# Patient Record
Sex: FEMALE | Race: BLACK OR AFRICAN AMERICAN | NOT HISPANIC OR LATINO | Employment: OTHER | URBAN - METROPOLITAN AREA
[De-identification: names, ages, dates, MRNs, and addresses within clinical notes are randomized per-mention and may not be internally consistent; named-entity substitution may affect disease eponyms.]

---

## 2021-09-24 ENCOUNTER — LAB VISIT (OUTPATIENT)
Dept: PRIMARY CARE CLINIC | Facility: OTHER | Age: 21
End: 2021-09-24
Attending: INTERNAL MEDICINE
Payer: MEDICAID

## 2021-09-24 DIAGNOSIS — Z20.822 ENCOUNTER FOR LABORATORY TESTING FOR COVID-19 VIRUS: ICD-10-CM

## 2021-09-24 LAB
SARS-COV-2 RNA RESP QL NAA+PROBE: NOT DETECTED
SARS-COV-2- CYCLE NUMBER: NORMAL

## 2021-09-24 PROCEDURE — U0003 INFECTIOUS AGENT DETECTION BY NUCLEIC ACID (DNA OR RNA); SEVERE ACUTE RESPIRATORY SYNDROME CORONAVIRUS 2 (SARS-COV-2) (CORONAVIRUS DISEASE [COVID-19]), AMPLIFIED PROBE TECHNIQUE, MAKING USE OF HIGH THROUGHPUT TECHNOLOGIES AS DESCRIBED BY CMS-2020-01-R: HCPCS | Performed by: INTERNAL MEDICINE

## 2022-01-07 ENCOUNTER — HOSPITAL ENCOUNTER (OUTPATIENT)
Facility: HOSPITAL | Age: 22
Discharge: HOME | End: 2022-01-11
Attending: EMERGENCY MEDICINE | Admitting: EMERGENCY MEDICINE
Payer: MEDICAID

## 2022-01-07 DIAGNOSIS — G40.909 NONINTRACTABLE EPILEPSY WITHOUT STATUS EPILEPTICUS, UNSPECIFIED EPILEPSY TYPE: ICD-10-CM

## 2022-01-07 DIAGNOSIS — U07.1 COVID-19: ICD-10-CM

## 2022-01-07 DIAGNOSIS — R56.9 SEIZURE: Primary | ICD-10-CM

## 2022-01-07 DIAGNOSIS — R00.0 TACHYCARDIA: ICD-10-CM

## 2022-01-07 LAB
ALBUMIN SERPL BCP-MCNC: 3.8 G/DL (ref 3.5–5.2)
ALP SERPL-CCNC: 71 U/L (ref 55–135)
ALT SERPL W/O P-5'-P-CCNC: 10 U/L (ref 10–44)
ANION GAP SERPL CALC-SCNC: 13 MMOL/L (ref 8–16)
AST SERPL-CCNC: 18 U/L (ref 10–40)
BASOPHILS # BLD AUTO: 0.02 K/UL (ref 0–0.2)
BASOPHILS NFR BLD: 0.2 % (ref 0–1.9)
BILIRUB SERPL-MCNC: 0.7 MG/DL (ref 0.1–1)
BUN SERPL-MCNC: 7 MG/DL (ref 6–20)
CALCIUM SERPL-MCNC: 9.2 MG/DL (ref 8.7–10.5)
CHLORIDE SERPL-SCNC: 102 MMOL/L (ref 95–110)
CO2 SERPL-SCNC: 20 MMOL/L (ref 23–29)
CREAT SERPL-MCNC: 0.8 MG/DL (ref 0.5–1.4)
CTP QC/QA: YES
DIFFERENTIAL METHOD: ABNORMAL
EOSINOPHIL # BLD AUTO: 0 K/UL (ref 0–0.5)
EOSINOPHIL NFR BLD: 0 % (ref 0–8)
ERYTHROCYTE [DISTWIDTH] IN BLOOD BY AUTOMATED COUNT: 13.7 % (ref 11.5–14.5)
EST. GFR  (AFRICAN AMERICAN): >60 ML/MIN/1.73 M^2
EST. GFR  (NON AFRICAN AMERICAN): >60 ML/MIN/1.73 M^2
GLUCOSE SERPL-MCNC: 80 MG/DL (ref 70–110)
HCT VFR BLD AUTO: 40 % (ref 37–48.5)
HGB BLD-MCNC: 13.2 G/DL (ref 12–16)
IMM GRANULOCYTES # BLD AUTO: 0.04 K/UL (ref 0–0.04)
IMM GRANULOCYTES NFR BLD AUTO: 0.3 % (ref 0–0.5)
LYMPHOCYTES # BLD AUTO: 1.2 K/UL (ref 1–4.8)
LYMPHOCYTES NFR BLD: 9.7 % (ref 18–48)
MAGNESIUM SERPL-MCNC: 1.9 MG/DL (ref 1.6–2.6)
MCH RBC QN AUTO: 30.3 PG (ref 27–31)
MCHC RBC AUTO-ENTMCNC: 33 G/DL (ref 32–36)
MCV RBC AUTO: 92 FL (ref 82–98)
MONOCYTES # BLD AUTO: 1.3 K/UL (ref 0.3–1)
MONOCYTES NFR BLD: 10.5 % (ref 4–15)
NEUTROPHILS # BLD AUTO: 10.1 K/UL (ref 1.8–7.7)
NEUTROPHILS NFR BLD: 79.3 % (ref 38–73)
NRBC BLD-RTO: 0 /100 WBC
PLATELET # BLD AUTO: 303 K/UL (ref 150–450)
PMV BLD AUTO: 8.8 FL (ref 9.2–12.9)
POTASSIUM SERPL-SCNC: 3 MMOL/L (ref 3.5–5.1)
PROT SERPL-MCNC: 7.8 G/DL (ref 6–8.4)
RBC # BLD AUTO: 4.36 M/UL (ref 4–5.4)
SARS-COV-2 RDRP RESP QL NAA+PROBE: POSITIVE
SODIUM SERPL-SCNC: 135 MMOL/L (ref 136–145)
WBC # BLD AUTO: 12.74 K/UL (ref 3.9–12.7)

## 2022-01-07 PROCEDURE — G0378 HOSPITAL OBSERVATION PER HR: HCPCS

## 2022-01-07 PROCEDURE — 93010 EKG 12-LEAD: ICD-10-PCS | Mod: ,,, | Performed by: INTERNAL MEDICINE

## 2022-01-07 PROCEDURE — 96375 TX/PRO/DX INJ NEW DRUG ADDON: CPT

## 2022-01-07 PROCEDURE — U0002 COVID-19 LAB TEST NON-CDC: HCPCS | Performed by: STUDENT IN AN ORGANIZED HEALTH CARE EDUCATION/TRAINING PROGRAM

## 2022-01-07 PROCEDURE — 80053 COMPREHEN METABOLIC PANEL: CPT | Performed by: STUDENT IN AN ORGANIZED HEALTH CARE EDUCATION/TRAINING PROGRAM

## 2022-01-07 PROCEDURE — 99285 PR EMERGENCY DEPT VISIT,LEVEL V: ICD-10-PCS | Mod: CR,CS,, | Performed by: EMERGENCY MEDICINE

## 2022-01-07 PROCEDURE — 85025 COMPLETE CBC W/AUTO DIFF WBC: CPT | Performed by: STUDENT IN AN ORGANIZED HEALTH CARE EDUCATION/TRAINING PROGRAM

## 2022-01-07 PROCEDURE — 93005 ELECTROCARDIOGRAM TRACING: CPT

## 2022-01-07 PROCEDURE — 99285 EMERGENCY DEPT VISIT HI MDM: CPT | Mod: CR,CS,, | Performed by: EMERGENCY MEDICINE

## 2022-01-07 PROCEDURE — 80185 ASSAY OF PHENYTOIN TOTAL: CPT | Performed by: STUDENT IN AN ORGANIZED HEALTH CARE EDUCATION/TRAINING PROGRAM

## 2022-01-07 PROCEDURE — 99285 EMERGENCY DEPT VISIT HI MDM: CPT | Mod: 25

## 2022-01-07 PROCEDURE — 83735 ASSAY OF MAGNESIUM: CPT | Performed by: STUDENT IN AN ORGANIZED HEALTH CARE EDUCATION/TRAINING PROGRAM

## 2022-01-07 PROCEDURE — 93010 ELECTROCARDIOGRAM REPORT: CPT | Mod: ,,, | Performed by: INTERNAL MEDICINE

## 2022-01-07 RX ORDER — LEVETIRACETAM 500 MG/5ML
2000 INJECTION, SOLUTION, CONCENTRATE INTRAVENOUS
Status: COMPLETED | OUTPATIENT
Start: 2022-01-07 | End: 2022-01-08

## 2022-01-08 PROBLEM — G40.909 EPILEPSY: Status: ACTIVE | Noted: 2022-01-08

## 2022-01-08 PROBLEM — U07.1 COVID-19: Status: ACTIVE | Noted: 2022-01-08

## 2022-01-08 LAB
AMPHET+METHAMPHET UR QL: NEGATIVE
APTT BLDCRRT: 28.8 SEC (ref 21–32)
BACTERIA #/AREA URNS AUTO: ABNORMAL /HPF
BARBITURATES UR QL SCN>200 NG/ML: NEGATIVE
BASOPHILS # BLD AUTO: 0.01 K/UL (ref 0–0.2)
BASOPHILS NFR BLD: 0.1 % (ref 0–1.9)
BENZODIAZ UR QL SCN>200 NG/ML: NEGATIVE
BILIRUB UR QL STRIP: NEGATIVE
BZE UR QL SCN: NEGATIVE
CANNABINOIDS UR QL SCN: NEGATIVE
CK SERPL-CCNC: 87 U/L (ref 20–180)
CLARITY UR REFRACT.AUTO: ABNORMAL
COLOR UR AUTO: YELLOW
CREAT UR-MCNC: 163 MG/DL (ref 15–325)
D DIMER PPP IA.FEU-MCNC: 0.4 MG/L FEU
DIFFERENTIAL METHOD: ABNORMAL
EOSINOPHIL # BLD AUTO: 0 K/UL (ref 0–0.5)
EOSINOPHIL NFR BLD: 0 % (ref 0–8)
ERYTHROCYTE [DISTWIDTH] IN BLOOD BY AUTOMATED COUNT: 13.7 % (ref 11.5–14.5)
ERYTHROCYTE [SEDIMENTATION RATE] IN BLOOD BY WESTERGREN METHOD: 45 MM/HR (ref 0–36)
ETHANOL UR-MCNC: <10 MG/DL
FERRITIN SERPL-MCNC: 51 NG/ML (ref 20–300)
GLUCOSE UR QL STRIP: NEGATIVE
HCT VFR BLD AUTO: 34.4 % (ref 37–48.5)
HGB BLD-MCNC: 11.6 G/DL (ref 12–16)
HGB UR QL STRIP: ABNORMAL
HYALINE CASTS UR QL AUTO: 0 /LPF
IMM GRANULOCYTES # BLD AUTO: 0.05 K/UL (ref 0–0.04)
IMM GRANULOCYTES NFR BLD AUTO: 0.4 % (ref 0–0.5)
INR PPP: 1 (ref 0.8–1.2)
KETONES UR QL STRIP: ABNORMAL
LDH SERPL L TO P-CCNC: 185 U/L (ref 110–260)
LEUKOCYTE ESTERASE UR QL STRIP: ABNORMAL
LYMPHOCYTES # BLD AUTO: 1.6 K/UL (ref 1–4.8)
LYMPHOCYTES NFR BLD: 13.8 % (ref 18–48)
MCH RBC QN AUTO: 29.7 PG (ref 27–31)
MCHC RBC AUTO-ENTMCNC: 33.7 G/DL (ref 32–36)
MCV RBC AUTO: 88 FL (ref 82–98)
METHADONE UR QL SCN>300 NG/ML: NEGATIVE
MICROSCOPIC COMMENT: ABNORMAL
MONOCYTES # BLD AUTO: 1 K/UL (ref 0.3–1)
MONOCYTES NFR BLD: 9.2 % (ref 4–15)
NEUTROPHILS # BLD AUTO: 8.6 K/UL (ref 1.8–7.7)
NEUTROPHILS NFR BLD: 76.5 % (ref 38–73)
NITRITE UR QL STRIP: POSITIVE
NRBC BLD-RTO: 0 /100 WBC
OPIATES UR QL SCN: NEGATIVE
PCP UR QL SCN>25 NG/ML: NEGATIVE
PH UR STRIP: 5 [PH] (ref 5–8)
PHENYTOIN SERPL-MCNC: <1.8 UG/ML (ref 10–20)
PLATELET # BLD AUTO: 294 K/UL (ref 150–450)
PMV BLD AUTO: 8.6 FL (ref 9.2–12.9)
POCT GLUCOSE: 97 MG/DL (ref 70–110)
PROT UR QL STRIP: ABNORMAL
PROTHROMBIN TIME: 11.3 SEC (ref 9–12.5)
RBC # BLD AUTO: 3.9 M/UL (ref 4–5.4)
RBC #/AREA URNS AUTO: 10 /HPF (ref 0–4)
SP GR UR STRIP: 1.02 (ref 1–1.03)
SQUAMOUS #/AREA URNS AUTO: 5 /HPF
TOXICOLOGY INFORMATION: NORMAL
URN SPEC COLLECT METH UR: ABNORMAL
WBC # BLD AUTO: 11.25 K/UL (ref 3.9–12.7)
WBC #/AREA URNS AUTO: >100 /HPF (ref 0–5)

## 2022-01-08 PROCEDURE — 87186 SC STD MICRODIL/AGAR DIL: CPT | Performed by: STUDENT IN AN ORGANIZED HEALTH CARE EDUCATION/TRAINING PROGRAM

## 2022-01-08 PROCEDURE — 25000242 PHARM REV CODE 250 ALT 637 W/ HCPCS: Performed by: STUDENT IN AN ORGANIZED HEALTH CARE EDUCATION/TRAINING PROGRAM

## 2022-01-08 PROCEDURE — 85652 RBC SED RATE AUTOMATED: CPT | Performed by: STUDENT IN AN ORGANIZED HEALTH CARE EDUCATION/TRAINING PROGRAM

## 2022-01-08 PROCEDURE — 85730 THROMBOPLASTIN TIME PARTIAL: CPT | Performed by: STUDENT IN AN ORGANIZED HEALTH CARE EDUCATION/TRAINING PROGRAM

## 2022-01-08 PROCEDURE — 87077 CULTURE AEROBIC IDENTIFY: CPT | Performed by: STUDENT IN AN ORGANIZED HEALTH CARE EDUCATION/TRAINING PROGRAM

## 2022-01-08 PROCEDURE — 25000003 PHARM REV CODE 250: Performed by: STUDENT IN AN ORGANIZED HEALTH CARE EDUCATION/TRAINING PROGRAM

## 2022-01-08 PROCEDURE — 80307 DRUG TEST PRSMV CHEM ANLYZR: CPT | Performed by: STUDENT IN AN ORGANIZED HEALTH CARE EDUCATION/TRAINING PROGRAM

## 2022-01-08 PROCEDURE — 85610 PROTHROMBIN TIME: CPT | Performed by: STUDENT IN AN ORGANIZED HEALTH CARE EDUCATION/TRAINING PROGRAM

## 2022-01-08 PROCEDURE — 99225 PR SUBSEQUENT OBSERVATION CARE,LEVEL II: ICD-10-PCS | Mod: ,,, | Performed by: INTERNAL MEDICINE

## 2022-01-08 PROCEDURE — 25000003 PHARM REV CODE 250: Performed by: INTERNAL MEDICINE

## 2022-01-08 PROCEDURE — 96361 HYDRATE IV INFUSION ADD-ON: CPT | Performed by: EMERGENCY MEDICINE

## 2022-01-08 PROCEDURE — 63600175 PHARM REV CODE 636 W HCPCS: Performed by: STUDENT IN AN ORGANIZED HEALTH CARE EDUCATION/TRAINING PROGRAM

## 2022-01-08 PROCEDURE — 85379 FIBRIN DEGRADATION QUANT: CPT | Performed by: STUDENT IN AN ORGANIZED HEALTH CARE EDUCATION/TRAINING PROGRAM

## 2022-01-08 PROCEDURE — 94761 N-INVAS EAR/PLS OXIMETRY MLT: CPT

## 2022-01-08 PROCEDURE — 99900035 HC TECH TIME PER 15 MIN (STAT)

## 2022-01-08 PROCEDURE — 87088 URINE BACTERIA CULTURE: CPT | Performed by: STUDENT IN AN ORGANIZED HEALTH CARE EDUCATION/TRAINING PROGRAM

## 2022-01-08 PROCEDURE — 81001 URINALYSIS AUTO W/SCOPE: CPT | Performed by: STUDENT IN AN ORGANIZED HEALTH CARE EDUCATION/TRAINING PROGRAM

## 2022-01-08 PROCEDURE — 82550 ASSAY OF CK (CPK): CPT | Performed by: STUDENT IN AN ORGANIZED HEALTH CARE EDUCATION/TRAINING PROGRAM

## 2022-01-08 PROCEDURE — 99225 PR SUBSEQUENT OBSERVATION CARE,LEVEL II: CPT | Mod: ,,, | Performed by: INTERNAL MEDICINE

## 2022-01-08 PROCEDURE — 82728 ASSAY OF FERRITIN: CPT | Performed by: STUDENT IN AN ORGANIZED HEALTH CARE EDUCATION/TRAINING PROGRAM

## 2022-01-08 PROCEDURE — G0378 HOSPITAL OBSERVATION PER HR: HCPCS

## 2022-01-08 PROCEDURE — 85025 COMPLETE CBC W/AUTO DIFF WBC: CPT | Performed by: STUDENT IN AN ORGANIZED HEALTH CARE EDUCATION/TRAINING PROGRAM

## 2022-01-08 PROCEDURE — 83615 LACTATE (LD) (LDH) ENZYME: CPT | Performed by: STUDENT IN AN ORGANIZED HEALTH CARE EDUCATION/TRAINING PROGRAM

## 2022-01-08 PROCEDURE — 94640 AIRWAY INHALATION TREATMENT: CPT

## 2022-01-08 PROCEDURE — 87086 URINE CULTURE/COLONY COUNT: CPT | Performed by: STUDENT IN AN ORGANIZED HEALTH CARE EDUCATION/TRAINING PROGRAM

## 2022-01-08 RX ORDER — ONDANSETRON 8 MG/1
8 TABLET, ORALLY DISINTEGRATING ORAL EVERY 8 HOURS PRN
Status: DISCONTINUED | OUTPATIENT
Start: 2022-01-08 | End: 2022-01-11 | Stop reason: HOSPADM

## 2022-01-08 RX ORDER — LEVETIRACETAM 500 MG/5ML
500 INJECTION, SOLUTION, CONCENTRATE INTRAVENOUS EVERY 12 HOURS
Status: DISCONTINUED | OUTPATIENT
Start: 2022-01-09 | End: 2022-01-08

## 2022-01-08 RX ORDER — ALBUTEROL SULFATE 90 UG/1
2 AEROSOL, METERED RESPIRATORY (INHALATION) EVERY 6 HOURS
Status: DISCONTINUED | OUTPATIENT
Start: 2022-01-08 | End: 2022-01-09

## 2022-01-08 RX ORDER — LEVETIRACETAM 500 MG/5ML
750 INJECTION, SOLUTION, CONCENTRATE INTRAVENOUS EVERY 12 HOURS
Status: DISCONTINUED | OUTPATIENT
Start: 2022-01-09 | End: 2022-01-10

## 2022-01-08 RX ORDER — ASCORBIC ACID 250 MG
500 TABLET ORAL 2 TIMES DAILY
Status: DISCONTINUED | OUTPATIENT
Start: 2022-01-08 | End: 2022-01-11 | Stop reason: HOSPADM

## 2022-01-08 RX ORDER — SODIUM CHLORIDE 9 MG/ML
INJECTION, SOLUTION INTRAVENOUS CONTINUOUS
Status: DISCONTINUED | OUTPATIENT
Start: 2022-01-08 | End: 2022-01-10

## 2022-01-08 RX ORDER — IBUPROFEN 200 MG
24 TABLET ORAL
Status: DISCONTINUED | OUTPATIENT
Start: 2022-01-08 | End: 2022-01-11 | Stop reason: HOSPADM

## 2022-01-08 RX ORDER — GLUCAGON 1 MG
1 KIT INJECTION
Status: DISCONTINUED | OUTPATIENT
Start: 2022-01-08 | End: 2022-01-11 | Stop reason: HOSPADM

## 2022-01-08 RX ORDER — ACETAMINOPHEN 325 MG/1
650 TABLET ORAL EVERY 4 HOURS PRN
Status: DISCONTINUED | OUTPATIENT
Start: 2022-01-08 | End: 2022-01-11 | Stop reason: HOSPADM

## 2022-01-08 RX ORDER — ACETAMINOPHEN 325 MG/1
650 TABLET ORAL EVERY 4 HOURS PRN
Status: DISCONTINUED | OUTPATIENT
Start: 2022-01-08 | End: 2022-01-08

## 2022-01-08 RX ORDER — SODIUM CHLORIDE 0.9 % (FLUSH) 0.9 %
10 SYRINGE (ML) INJECTION
Status: DISCONTINUED | OUTPATIENT
Start: 2022-01-08 | End: 2022-01-11 | Stop reason: HOSPADM

## 2022-01-08 RX ORDER — IBUPROFEN 200 MG
16 TABLET ORAL
Status: DISCONTINUED | OUTPATIENT
Start: 2022-01-08 | End: 2022-01-11 | Stop reason: HOSPADM

## 2022-01-08 RX ADMIN — POTASSIUM BICARBONATE 20 MEQ: 391 TABLET, EFFERVESCENT ORAL at 12:01

## 2022-01-08 RX ADMIN — SODIUM CHLORIDE 50 ML/HR: 0.9 INJECTION, SOLUTION INTRAVENOUS at 08:01

## 2022-01-08 RX ADMIN — ALBUTEROL SULFATE 2 PUFF: 108 INHALANT RESPIRATORY (INHALATION) at 01:01

## 2022-01-08 RX ADMIN — LEVETIRACETAM 2000 MG: 500 INJECTION, SOLUTION INTRAVENOUS at 12:01

## 2022-01-08 RX ADMIN — ACETAMINOPHEN 650 MG: 325 TABLET ORAL at 08:01

## 2022-01-08 RX ADMIN — Medication 500 MG: at 09:01

## 2022-01-08 RX ADMIN — ALBUTEROL SULFATE 2 PUFF: 108 INHALANT RESPIRATORY (INHALATION) at 07:01

## 2022-01-08 RX ADMIN — MULTIPLE VITAMINS W/ MINERALS TAB 1 TABLET: TAB at 08:01

## 2022-01-08 RX ADMIN — ACETAMINOPHEN 650 MG: 325 TABLET ORAL at 01:01

## 2022-01-08 RX ADMIN — Medication 500 MG: at 08:01

## 2022-01-08 NOTE — PLAN OF CARE
[facility]  Neurology  Plan of Care  Inpatient consult to Neurology  Consult performed by: Pedro Irene MD  Consult ordered by: Stan Zepeda MD  Reason for consult: Breakthrough seizure management        Neurology consulted for breakthrough seizure management in Mary Gilmore, a 21 y.o. female with history of epilepsy admitted with witnessed breakthrough seizures in setting of COVID infection. Followed by Neurology at [facility] but does not remember name of Primary Neurologist; care everywhere without documentation. Patient states she is adherent to current anti seizure medication regimen (phenytoin 100 mg three times a day and IV phenytoin infusions with unknown frequency or dose). She notes feeling generally unwell the past few days as well as 2 breakthrough seizures prior to Emergency Department presentation. Per primary team history and physical documentation the patients mother states Ms Gilmore was post ictal following her seizure but did not display tongue biting or incontinence. Following her seizure the patient had body aches, chills, and one episode of vomiting.    ED evaluation notable for hypertension, tachycardia, and one brief episode of shaking with extremity movements lasting a couple seconds, however, no post ictal period. Workup notable for positive COVID testing. Patient managed with levetiracetam 2g loading dose and 500 mg two times a day.    No acute or concerning events noted by overnight staff. Febrile and hypotensive during AM rounds. Patient is conscious and comfortable.    Patient is alert, oriented, not in acute distress, and without focal neurologic deficits on neurological exam.    Diagnostics reviewed.    No pending diagnostics to follow up on.    Assessment  Known epileptic patient with breakthrough seizure likely secondary to underlying COVID infection. Back to neurologic baseline. No further seizures overnight. Notified of seizure per primary team at 11:45 AM.  Maintained on phenytoin 100 mg three times a day outpatient (unverified). Started on levetiractam inpatient. Primary team provider notes patient is reluctant to increase levetiracetam due to previous history of increased seizures on higher doses. Due to breakthrough seizure, recommend spot EEG (none listed in patients chart) and increase of levetiracetam to 750 mg BID until dilantin dosing and frequency can be verified. At that point can switch to home medication regimen. Will follow up EEG.    Recommendations  Diagnostic:  - spot EEG  Therapeutic  - increase levetiracetam to 750 mg BID    - start dilantin 100 mg TID (once verified)      Discussed patient with staff.   Neurology will follow-up with patient. Please contact Neurology for any questions or concerns.       Pedro Irene MD  Neurology  [facility] [place], [place]

## 2022-01-08 NOTE — PLAN OF CARE
Problem: Skin Injury Risk Increased  Goal: Skin Health and Integrity  Outcome: Ongoing, Progressing     Problem: Fever  Goal: Body Temperature in Desired Range  Outcome: Ongoing, Progressing     Problem: Seizure, Active Management  Goal: Absence of Seizure/Seizure-Related Injury  Outcome: Ongoing, Progressing

## 2022-01-08 NOTE — SUBJECTIVE & OBJECTIVE
No past medical history on file.    No past surgical history on file.    Review of patient's allergies indicates:  No Known Allergies    No current facility-administered medications on file prior to encounter.     No current outpatient medications on file prior to encounter.     Family History    None       Tobacco Use    Smoking status: Not on file    Smokeless tobacco: Not on file   Substance and Sexual Activity    Alcohol use: Not on file    Drug use: Not on file    Sexual activity: Not on file     Review of Systems   Constitutional: Positive for chills and fatigue. Negative for fever.   Respiratory: Negative for shortness of breath.    Cardiovascular: Negative for chest pain.   Genitourinary: Negative for dysuria.   Musculoskeletal: Negative for arthralgias and joint swelling.   Skin: Negative for rash.   Neurological: Positive for headaches.   Psychiatric/Behavioral: Negative for confusion.     Objective:     Vital Signs (Most Recent):  Temp: 99.7 °F (37.6 °C) (01/07/22 2049)  Pulse: 103 (01/08/22 0021)  Resp: 20 (01/08/22 0021)  BP: 106/67 (01/08/22 0021)  SpO2: 100 % (01/08/22 0021) Vital Signs (24h Range):  Temp:  [99.7 °F (37.6 °C)] 99.7 °F (37.6 °C)  Pulse:  [103-132] 103  Resp:  [18-24] 20  SpO2:  [96 %-100 %] 100 %  BP: (106-143)/(67-73) 106/67        There is no height or weight on file to calculate BMI.    Physical Exam  Constitutional:       General: She is not in acute distress.     Appearance: Normal appearance. She is normal weight. She is not ill-appearing.   HENT:      Head: Normocephalic and atraumatic.   Eyes:      Extraocular Movements: Extraocular movements intact.      Pupils: Pupils are equal, round, and reactive to light.   Cardiovascular:      Rate and Rhythm: Normal rate.      Pulses: Normal pulses.      Heart sounds: No murmur heard.      Pulmonary:      Effort: Pulmonary effort is normal. No respiratory distress.      Breath sounds: Normal breath sounds. No wheezing.   Abdominal:       General: Abdomen is flat. Bowel sounds are normal.      Palpations: Abdomen is soft.   Musculoskeletal:         General: No swelling or tenderness.   Skin:     General: Skin is warm and dry.      Capillary Refill: Capillary refill takes less than 2 seconds.      Findings: No rash.   Neurological:      General: No focal deficit present.      Mental Status: She is alert and oriented to person, place, and time. Mental status is at baseline.      Cranial Nerves: No cranial nerve deficit.   Psychiatric:         Mood and Affect: Mood normal.         Behavior: Behavior normal.         Thought Content: Thought content normal.         Judgment: Judgment normal.          Significant Labs:   All pertinent labs within the past 24 hours have been reviewed.  CBC:   Recent Labs   Lab 01/07/22  2220   WBC 12.74*   HGB 13.2   HCT 40.0        CMP:   Recent Labs   Lab 01/07/22  2220   *   K 3.0*      CO2 20*   GLU 80   BUN 7   CREATININE 0.8   CALCIUM 9.2   PROT 7.8   ALBUMIN 3.8   BILITOT 0.7   ALKPHOS 71   AST 18   ALT 10   ANIONGAP 13   EGFRNONAA >60.0       Significant Imaging: I have reviewed all pertinent imaging results/findings within the past 24 hours.

## 2022-01-08 NOTE — HOSPITAL COURSE
Admitted with seizures and COVID.  COVID finding was incidental other than her fever.  No respiratory compromise.  She has been treated with Keppra.  She said she did not like the way the phenytoin made her feel, and she would not go back to that.  She wanted to stay with the Keppra.  She had 1 seizure during her hospital stay, and Keppra dosing was increased to 750 mg twice a day.  Urine culture from January 8 grew presumptive E coli.  Rocephin was started.  Temperature improved on January 9 with no further seizure activity.  She remained afebrile with no respiratory symptoms.  During her stay, her arthralgias, myalgias, and fatigue significantly improved.  Keppra was transitioned to oral, and she tolerated that without difficulty.  She noted no headaches and no neurologic changes on the oral Keppra.  She felt well and was eager to go home on January 11. Of note, her systolic blood pressure decreased into the 90s at times.  She was given IV fluids, but she noted that her blood pressure often does that during hospitalizations.  She was asymptomatic with those blood pressures.  She will be discharged home on oral Keppra and a course of oral antibiotics to complete for her urinary tract infection.  She noted she is planning to follow-up with Neurology at [facility].  Seizure precautions were given during her discharge.  COVID symptoms tracker was ordered for discharge.  Additionally, she was recommended to isolate with the COVID for 10 days past her positive COVID test on January 7.

## 2022-01-08 NOTE — PLAN OF CARE
Problem: Adult Inpatient Plan of Care  Goal: Plan of Care Review  Outcome: Ongoing, Progressing  Goal: Patient-Specific Goal (Individualized)  Outcome: Ongoing, Progressing  Goal: Absence of Hospital-Acquired Illness or Injury  Outcome: Ongoing, Progressing  Goal: Optimal Comfort and Wellbeing  Outcome: Ongoing, Progressing  Goal: Readiness for Transition of Care  Outcome: Ongoing, Progressing     Problem: Skin Injury Risk Increased  Goal: Skin Health and Integrity  Outcome: Ongoing, Progressing     Problem: Fever  Goal: Body Temperature in Desired Range  Outcome: Ongoing, Progressing     Problem: Seizure, Active Management  Goal: Absence of Seizure/Seizure-Related Injury  Outcome: Ongoing, Progressing     Problem: Infection  Goal: Absence of Infection Signs and Symptoms  Outcome: Ongoing, Progressing

## 2022-01-08 NOTE — HPI
21 year old AAF with a PMH of Epilepsy on Dilantin presents to [facility] with complaints of break thru seizures for 2 days. Patient was working out this morning between 6am and 9am. After her workout, went to shower and started to feel lightheaded. Lost consciousness and friend at gym witnessed a tonic clonic seizure. No incontinence or tongue biting. Has her normal post-ictal headache. Mother reports a 20 minute post-ictal period. Had a similar episode yesterday at home. Concern her PO medications are not as effective as the IV infusions she was getting 3 months ago (of dilantin). Reports not feeling well over the past few days and started her menstrual cycles yesterday. Did not eat breakfast this morning prior to working out and took her medication a little late today, but no missed medications. One episode of vomiting yesterday. No fevers/chills, chest pain, sore throat, coughing, congestion, abdominal pain, or urinary symptoms.     Patient also tested positive for COVID. She states she has had headaches for the past 3 days and has been around other people who were sick but no one has been confirmed COVID positive. She states she currently has body aches and chills but she believes she has these symptoms after having seizures. She denies SOB.      PMH: Epilepsy on Dilantin, eczema  PSH: n/a  FH: n/a  SH: n/a

## 2022-01-08 NOTE — ASSESSMENT & PLAN NOTE
-patient reports she did not like the way the phenytoin made her feel, and she does not want to go back to that  -she would like to continue on with Keppra  -appreciate Neurology input  -monitor her response on Keppra  -EEG attempted, but she has sew-ins but she did not want to remove and EEG could not be done

## 2022-01-08 NOTE — ASSESSMENT & PLAN NOTE
COVID-19 Virus Infection  Viral Pneumonia due to COVID-19  - COVID-19 testing: Collection Date: 9/24/2021 Collection Time:   2:03 PM   Patient is identified as Low risk for severe complications of COVID 19 based on COVID risk score of 0   Initiate standard COVID protocols; COVID-19 testing Collection Date: 9/24/2021 Collection Time:   2:03 PM ,Infection Control notification  and isolation- respiratory, contact and droplet per protocol    Diagnostics: (leukopenia, hyponatremia, hyperferritinemia, elevated troponin, elevated d-dimer, age, and comorbidities are significant predictors of poor clinical outcome)  CBC, CMP, Ferritin and CRP    Management: Continuous cardiac monitoring.     Patient does not require O2 at this time, continue to monitor  She is low risk at this time    Advance Care Planning  Current advance care plan has not been discussed with patient/family/POA and patient currently wishes Full Code.   -continue multivitamin  -monitor for changes in status

## 2022-01-08 NOTE — ED PROVIDER NOTES
Encounter Date: 1/7/2022       History     Chief Complaint   Patient presents with    Seizures     Pt actively seizing in triage. Hx of seizures. Did take meds today.      HPI   Patient is a 21-year-old female currently on Dilantin for possible seizures presenting with mother after seizure at gym earlier today. Patient was working out this morning between 6am and 9am. After her workout, went to shower and started to feel lightheaded. Lost consciousness and friend at gym witnessed a tonic clonic seizure. No incontinence or tongue biting. Has her normal post-ictal headache. Mother reports a 20 minute post-ictal period. Had a similar episode yesterday at home. Concern her PO medications are not as effective as the IV infusions she was getting 3 months ago (of dilantin). Reports not feeling well over the past few days and started her menstrual cycles yesterday. Did not eat breakfast this morning prior to working out and took her medication a little late today, but no missed medications. One episode of vomiting yesterday. No fevers/chills, chest pain, sore throat, coughing, congestion, abdominal pain, or urinary symptoms.     Review of patient's allergies indicates:  No Known Allergies  No past medical history on file.  No past surgical history on file.  No family history on file.     Review of Systems   Constitutional: Negative for chills and fever.   HENT: Negative for congestion and sore throat.    Respiratory: Negative for cough and shortness of breath.    Cardiovascular: Negative for chest pain and palpitations.   Gastrointestinal: Positive for vomiting. Negative for abdominal pain, constipation and diarrhea.   Genitourinary: Positive for flank pain (left) and vaginal bleeding. Negative for dysuria and hematuria.   Musculoskeletal: Negative for back pain and gait problem.   Skin: Negative for color change and rash.   Neurological: Positive for seizures and headaches.   Hematological: Does not bruise/bleed easily.        Physical Exam     Initial Vitals   BP Pulse Resp Temp SpO2   01/07/22 2044 01/07/22 2044 01/07/22 2044 01/07/22 2049 01/07/22 2049   (!) 143/73 (!) 132 (!) 24 99.7 °F (37.6 °C) 96 %      MAP       --                Physical Exam    Constitutional: She appears well-developed and well-nourished. She is not diaphoretic.   Young thin female who looks her stated age, alert and oriented speaking in full sentences. Has a brief episode of shaking with extremity movement lasting a couple seconds in room with no post-ictal periods (tachycardia with normal tracking) that self resolved.    HENT:   Head: Normocephalic and atraumatic.   Right Ear: External ear normal.   Left Ear: External ear normal.   Mouth/Throat: Oropharynx is clear and moist. No oropharyngeal exudate.   Eyes: Conjunctivae and EOM are normal. Pupils are equal, round, and reactive to light. No scleral icterus.   Neck: Neck supple. No JVD present.   Normal range of motion.  Cardiovascular: Normal rate, regular rhythm, normal heart sounds and intact distal pulses.   No murmur heard.  Pulmonary/Chest: Breath sounds normal. No stridor. No respiratory distress. She has no wheezes. She has no rales.   Abdominal: Abdomen is soft. Bowel sounds are normal. She exhibits no distension. There is no abdominal tenderness.   No CVA tenderness or abdominal tenderness.  There is no rebound.   Musculoskeletal:         General: No tenderness or edema. Normal range of motion.      Cervical back: Normal range of motion and neck supple.     Neurological: She is alert and oriented to person, place, and time. GCS score is 15. GCS eye subscore is 4. GCS verbal subscore is 5. GCS motor subscore is 6.   Skin: Skin is warm and dry. Capillary refill takes less than 2 seconds.   Psychiatric: She has a normal mood and affect.         ED Course   Procedures  Labs Reviewed   CBC W/ AUTO DIFFERENTIAL - Abnormal; Notable for the following components:       Result Value    WBC 12.74 (*)      MPV 8.8 (*)     Gran # (ANC) 10.1 (*)     Mono # 1.3 (*)     Gran % 79.3 (*)     Lymph % 9.7 (*)     All other components within normal limits   COMPREHENSIVE METABOLIC PANEL - Abnormal; Notable for the following components:    Sodium 135 (*)     Potassium 3.0 (*)     CO2 20 (*)     All other components within normal limits   SARS-COV-2 RDRP GENE - Abnormal; Notable for the following components:    POC Rapid COVID Positive (*)     All other components within normal limits   MAGNESIUM   URINALYSIS, REFLEX TO URINE CULTURE   PREGNANCY TEST, URINE RAPID   PHENYTOIN LEVEL, TOTAL   POCT GLUCOSE MONITORING CONTINUOUS          Imaging Results          X-Ray Chest AP Portable (Final result)  Result time 01/07/22 23:18:25    Final result by Jose Melo MD (01/07/22 23:18:25)                 Impression:      No acute process.      Electronically signed by: Jose Melo MD  Date:    01/07/2022  Time:    23:18             Narrative:    EXAMINATION:  XR CHEST AP PORTABLE    CLINICAL HISTORY:  Unspecified convulsions    TECHNIQUE:  Single frontal view of the chest was performed.    COMPARISON:  None    FINDINGS:  Monitoring EKG leads are present.  The trachea is unremarkable.  The cardiomediastinal silhouette is within normal limits.  The hemidiaphragms are unremarkable.  There are no pleural effusions.  There is no evidence of a pneumothorax.  There is no evidence of pneumomediastinum.  No airspace opacity is present.  The osseous structures are unremarkable.                                 Medications   levETIRAcetam injection 2,000 mg (2,000 mg Intravenous Given 1/8/22 0012)   potassium bicarbonate disintegrating tablet 20 mEq (20 mEq Oral Given 1/8/22 0013)           APC / Resident Notes:   Patient is a 21-year-old female presenting with breakthrough seizures despite taking her Dilantin as prescribed.  Had a seizure yesterday as well as 1 today.  Reports feeling unwell for the past couple of days, nausea yesterday, and  skipped breakfast today.  No recent fevers or chills.  Denies sore throat or coughing.  No diarrhea or urinary symptoms.  Her triage note, had a seizure in the waiting room but no witnessed seizures in the room.  Had a brief shaking episode in the room prior to interview the mother states that her seizures are usually more violent.  Vital signs concerning for heart rate in the 100s to 120s.  Afebrile with no shock index.  Normal respiratory and oxygen saturation.  Exam overall unremarkable patient is not postictal.  Concern for recent viral infection, pregnancy, or UTI contributing to lower seizure threshold.  Ordered electrolytes with for any electrolyte derangements that could also be contributing to her breakthrough seizures and Dilantin level ordered as well.  Given a Keppra load of 2 g.  Workup positive for COVID-19 which is most likely contributing to her symptoms today.  However with multiple seizures within 48 hours, consulted Internal Medicine for observation and determine if her medication levels need to be adjusted.    Catherine Monzon MD  [facility] Emergency Medicine, PGY4  01/08/2022 12:31 AM                Attending Attestation:   Physician Attestation Statement for Resident:  As the supervising MD   Physician Attestation Statement: I have personally seen and examined this patient.   I agree with the above history. -:   As the supervising MD I agree with the above PE.    As the supervising MD I agree with the above treatment, course, plan, and disposition.  I have reviewed and agree with the residents interpretation of the following: lab data and EKG.  I have reviewed the following: old records at this facility.                         Clinical Impression:   Final diagnoses:  [R00.0] Tachycardia  [R56.9] Seizure (Primary)  [U07.1] COVID-19          ED Disposition Condition    Observation               Catherine Monzon MD  Resident  01/08/22 0031       Cassidy Jensen MD  01/08/22 0052

## 2022-01-08 NOTE — SUBJECTIVE & OBJECTIVE
Interval History: No respiratory compromise.  She has been treated with Keppra.  She said she did not like the way the phenytoin made her feel, and she would not go back to that.  She wanted to stay with the Keppra.  She had since arriving to her hospital room today.  It resolved without intervention and her postictal.  Was fairly short.  Keppra dosing was increased to 750 mg twice a day.    Review of Systems   Constitutional: Positive for chills and fever.   HENT: Negative for congestion and sore throat.    Eyes: Negative for photophobia and visual disturbance.   Respiratory: Negative for shortness of breath and wheezing.    Cardiovascular: Negative for chest pain and palpitations.   Gastrointestinal: Negative for abdominal pain, nausea and vomiting.   Genitourinary: Negative for dysuria and hematuria.   Musculoskeletal: Positive for arthralgias and myalgias.   Skin: Negative for pallor and rash.   Neurological: Negative for speech difficulty and weakness.        Seizures     Objective:     Vital Signs (Most Recent):  Temp: 99.7 °F (37.6 °C) (01/08/22 1127)  Pulse: 108 (01/08/22 1150)  Resp: 20 (01/08/22 0300)  BP:  (unable to get accurate B/P during seizure activity d/t pt. experiencing shakes during seizure activity) (01/08/22 1127)  SpO2: 98 % (01/08/22 1617) Vital Signs (24h Range):  Temp:  [98 °F (36.7 °C)-102.4 °F (39.1 °C)] 99.7 °F (37.6 °C)  Pulse:  [] 108  Resp:  [16-24] 20  SpO2:  [96 %-100 %] 98 %  BP: ()/(50-73) 98/50        There is no height or weight on file to calculate BMI.  No intake or output data in the 24 hours ending 01/08/22 1458   Physical Exam  Vitals reviewed.   Constitutional:       General: She is not in acute distress.  HENT:      Head: Normocephalic and atraumatic.   Eyes:      Extraocular Movements: Extraocular movements intact.      Conjunctiva/sclera: Conjunctivae normal.   Cardiovascular:      Rate and Rhythm: Normal rate and regular rhythm.   Pulmonary:      Effort:  Pulmonary effort is normal.      Breath sounds: Normal breath sounds.   Abdominal:      General: Bowel sounds are normal.      Palpations: Abdomen is soft.      Tenderness: There is no abdominal tenderness.   Musculoskeletal:         General: No swelling or tenderness.      Cervical back: Neck supple. No tenderness.   Skin:     General: Skin is warm and dry.   Neurological:      General: No focal deficit present.      Mental Status: She is alert and oriented to person, place, and time.      Cranial Nerves: No cranial nerve deficit.      Motor: No weakness.         Significant Labs:   All pertinent labs within the past 24 hours have been reviewed.  CBC:   Recent Labs   Lab 01/07/22  2220 01/08/22  0149   WBC 12.74* 11.25   HGB 13.2 11.6*   HCT 40.0 34.4*    294     CMP:   Recent Labs   Lab 01/07/22  2220   *   K 3.0*      CO2 20*   GLU 80   BUN 7   CREATININE 0.8   CALCIUM 9.2   PROT 7.8   ALBUMIN 3.8   BILITOT 0.7   ALKPHOS 71   AST 18   ALT 10   ANIONGAP 13   EGFRNONAA >60.0

## 2022-01-08 NOTE — ASSESSMENT & PLAN NOTE
COVID-19 Virus Infection  Viral Pneumonia due to COVID-19  - COVID-19 testing: Collection Date: 9/24/2021 Collection Time:   2:03 PM   Patient is identified as High risk for severe complications of COVID 19 based on COVID risk score of 0   Initiate standard COVID protocols; COVID-19 testing Collection Date: 9/24/2021 Collection Time:   2:03 PM ,Infection Control notification  and isolation- respiratory, contact and droplet per protocol    Diagnostics: (leukopenia, hyponatremia, hyperferritinemia, elevated troponin, elevated d-dimer, age, and comorbidities are significant predictors of poor clinical outcome)  CBC, CMP, Ferritin and CRP    Management: Continuous cardiac monitoring.     Patient does not require O2 at this time, continue to monitor  She is low risk at this time    Advance Care Planning  Current advance care plan has not been discussed with patient/family/POA and patient currently wishes Full Code.     Epilepsy  Patient given loading dose of Keppra 2g in ED  Currently stable  Continue Keppra 500mg BID  Consult Neurology in am for dose adjustment

## 2022-01-08 NOTE — PROGRESS NOTES
[facility] - Neurosurgery ([facility])  [facility] Medicine  Progress Note    Patient Name: Mary Gilmore  MRN: [id number]  Patient Class: OP- Observation   Admission Date: 1/7/2022  Length of Stay: 0 days  Attending Physician: Cresencio Oscar MD  Primary Care Provider: Philippe Viera Jr, MD        Subjective:     Principal Problem:<principal problem not specified>        HPI:  21 year old AAF with a PMH of Epilepsy on Dilantin presents to [facility] with complaints of break thru seizures for 2 days. Patient was working out this morning between 6am and 9am. After her workout, went to shower and started to feel lightheaded. Lost consciousness and friend at gym witnessed a tonic clonic seizure. No incontinence or tongue biting. Has her normal post-ictal headache. Mother reports a 20 minute post-ictal period. Had a similar episode yesterday at home. Concern her PO medications are not as effective as the IV infusions she was getting 3 months ago (of dilantin). Reports not feeling well over the past few days and started her menstrual cycles yesterday. Did not eat breakfast this morning prior to working out and took her medication a little late today, but no missed medications. One episode of vomiting yesterday. No fevers/chills, chest pain, sore throat, coughing, congestion, abdominal pain, or urinary symptoms.     Patient also tested positive for COVID. She states she has had headaches for the past 3 days and has been around other people who were sick but no one has been confirmed COVID positive. She states she currently has body aches and chills but she believes she has these symptoms after having seizures. She denies SOB.      PMH: Epilepsy on Dilantin, eczema  PSH: n/a  FH: n/a  SH: n/a      Overview/Hospital Course:  Admitted with seizures and COVID.  COVID finding was incidental other than her fever.  No respiratory compromise.  She has been treated with Keppra.  She said she did not like the way the phenytoin made her feel,  and she would not go back to that.  She wanted to stay with the Keppra.  She had since arriving to her hospital room today.  It resolved without intervention and her postictal.  Was fairly short.  Keppra dosing was increased to 750 mg twice a day.      Interval History: No respiratory compromise.  She has been treated with Keppra.  She said she did not like the way the phenytoin made her feel, and she would not go back to that.  She wanted to stay with the Keppra.  She had since arriving to her hospital room today.  It resolved without intervention and her postictal.  Was fairly short.  Keppra dosing was increased to 750 mg twice a day.    Review of Systems   Constitutional: Positive for chills and fever.   HENT: Negative for congestion and sore throat.    Eyes: Negative for photophobia and visual disturbance.   Respiratory: Negative for shortness of breath and wheezing.    Cardiovascular: Negative for chest pain and palpitations.   Gastrointestinal: Negative for abdominal pain, nausea and vomiting.   Genitourinary: Negative for dysuria and hematuria.   Musculoskeletal: Positive for arthralgias and myalgias.   Skin: Negative for pallor and rash.   Neurological: Negative for speech difficulty and weakness.        Seizures     Objective:     Vital Signs (Most Recent):  Temp: 99.7 °F (37.6 °C) (01/08/22 1127)  Pulse: 108 (01/08/22 1150)  Resp: 20 (01/08/22 0300)  BP:  (unable to get accurate B/P during seizure activity d/t pt. experiencing shakes during seizure activity) (01/08/22 1127)  SpO2: 98 % (01/08/22 1617) Vital Signs (24h Range):  Temp:  [98 °F (36.7 °C)-102.4 °F (39.1 °C)] 99.7 °F (37.6 °C)  Pulse:  [] 108  Resp:  [16-24] 20  SpO2:  [96 %-100 %] 98 %  BP: ()/(50-73) 98/50        There is no height or weight on file to calculate BMI.  No intake or output data in the 24 hours ending 01/08/22 7908   Physical Exam  Vitals reviewed.   Constitutional:       General: She is not in acute distress.  HENT:       Head: Normocephalic and atraumatic.   Eyes:      Extraocular Movements: Extraocular movements intact.      Conjunctiva/sclera: Conjunctivae normal.   Cardiovascular:      Rate and Rhythm: Normal rate and regular rhythm.   Pulmonary:      Effort: Pulmonary effort is normal.      Breath sounds: Normal breath sounds.   Abdominal:      General: Bowel sounds are normal.      Palpations: Abdomen is soft.      Tenderness: There is no abdominal tenderness.   Musculoskeletal:         General: No swelling or tenderness.      Cervical back: Neck supple. No tenderness.   Skin:     General: Skin is warm and dry.   Neurological:      General: No focal deficit present.      Mental Status: She is alert and oriented to person, place, and time.      Cranial Nerves: No cranial nerve deficit.      Motor: No weakness.         Significant Labs:   All pertinent labs within the past 24 hours have been reviewed.  CBC:   Recent Labs   Lab 01/07/22  2220 01/08/22  0149   WBC 12.74* 11.25   HGB 13.2 11.6*   HCT 40.0 34.4*    294     CMP:   Recent Labs   Lab 01/07/22  2220   *   K 3.0*      CO2 20*   GLU 80   BUN 7   CREATININE 0.8   CALCIUM 9.2   PROT 7.8   ALBUMIN 3.8   BILITOT 0.7   ALKPHOS 71   AST 18   ALT 10   ANIONGAP 13   EGFRNONAA >60.0           Assessment/Plan:      Epilepsy  -patient reports she did not like the way the phenytoin made her feel, and she does not want to go back to that  -she would like to continue on with Keppra  -appreciate Neurology input  -monitor her response on Keppra  -EEG attempted, but she has sew-ins but she did not want to remove and EEG could not be done      COVID-19  COVID-19 Virus Infection  Viral Pneumonia due to COVID-19  - COVID-19 testing: Collection Date: 9/24/2021 Collection Time:   2:03 PM   Patient is identified as Low risk for severe complications of COVID 19 based on COVID risk score of 0   Initiate standard COVID protocols; COVID-19 testing Collection Date: 9/24/2021  Collection Time:   2:03 PM ,Infection Control notification  and isolation- respiratory, contact and droplet per protocol    Diagnostics: (leukopenia, hyponatremia, hyperferritinemia, elevated troponin, elevated d-dimer, age, and comorbidities are significant predictors of poor clinical outcome)  CBC, CMP, Ferritin and CRP    Management: Continuous cardiac monitoring.     Patient does not require O2 at this time, continue to monitor  She is low risk at this time    Advance Care Planning  Current advance care plan has not been discussed with patient/family/POA and patient currently wishes Full Code.  -continue multivitamin  -monitor for changes in status      VTE Risk Mitigation (From admission, onward)    None          Discharge Planning   NESHA:      Code Status: Full Code   Is the patient medically ready for discharge?:     Reason for patient still in hospital (select all that apply): Treatment                     ANASTASIA Oscar MD  Department of Hospital Medicine   [facility] - Neurosurgery ([facility])

## 2022-01-08 NOTE — NURSING
Patient admitted to Room 924 at 0300.  VSS except temperature.  Temp was 102.4  Patient received Tylenol at 0230 in the Emergency Room.  Patient stated she had 1 seizure on Thursday and 5 seizures on Friday.  Patient stated she was tested for Covid on Thursday because her parents had returned from [facility] where they tested positive 4 days after arriving.  Patient stated she had no signs of Covid and was surprised she tested positive in the Emergency Room.  No complaints of chest pain or shortness of breath.  No edema to the lower extremities.  Patient states she can not walk and she has no feelings in her lower extremities.  She has feeling and good  with her upper extremities.  Patient remained free from falls and incidents this shift.

## 2022-01-08 NOTE — NURSING
Ariela here to perform EEG. She phoned me and stated pt. Was refusing to have her sew in removed to perform EEG. Dr. Ireen, with Neurology team made aware of pt. refusal to remove sew in per EEG tech Ariela. Stated to document pt.'s refusal.

## 2022-01-08 NOTE — H&P
[facility] - Emergency Dept  Hospital Medicine  History & Physical    Patient Name: Mray Gilmore  MRN: [id number]  Patient Class: OP- Observation  Admission Date: 1/7/2022  Attending Physician: Cresencio Oscar MD   Primary Care Provider: Philippe Viera Jr, MD         Patient information was obtained from patient and ER records.     Subjective:     Principal Problem:<principal problem not specified>    Chief Complaint:   Chief Complaint   Patient presents with    Seizures     Pt actively seizing in triage. Hx of seizures. Did take meds today.         HPI: 21 year old AAF with a PMH of Epilepsy on Dilantin presents to [facility] with complaints of break thru seizures for 2 days. Patient was working out this morning between 6am and 9am. After her workout, went to shower and started to feel lightheaded. Lost consciousness and friend at gym witnessed a tonic clonic seizure. No incontinence or tongue biting. Has her normal post-ictal headache. Mother reports a 20 minute post-ictal period. Had a similar episode yesterday at home. Concern her PO medications are not as effective as the IV infusions she was getting 3 months ago (of dilantin). Reports not feeling well over the past few days and started her menstrual cycles yesterday. Did not eat breakfast this morning prior to working out and took her medication a little late today, but no missed medications. One episode of vomiting yesterday. No fevers/chills, chest pain, sore throat, coughing, congestion, abdominal pain, or urinary symptoms.     Patient also tested positive for COVID. She states she has had headaches for the past 3 days and has been around other people who were sick but no one has been confirmed COVID positive. She states she currently has body aches and chills but she believes she has these symptoms after having seizures. She denies SOB.      PMH: Epilepsy on Dilantin, eczema  PSH: n/a  FH: n/a  SH: n/a      No past medical history on file.    No past surgical  history on file.    Review of patient's allergies indicates:  No Known Allergies    No current facility-administered medications on file prior to encounter.     No current outpatient medications on file prior to encounter.     Family History    None       Tobacco Use    Smoking status: Not on file    Smokeless tobacco: Not on file   Substance and Sexual Activity    Alcohol use: Not on file    Drug use: Not on file    Sexual activity: Not on file     Review of Systems   Constitutional: Positive for chills and fatigue. Negative for fever.   Respiratory: Negative for shortness of breath.    Cardiovascular: Negative for chest pain.   Genitourinary: Negative for dysuria.   Musculoskeletal: Negative for arthralgias and joint swelling.   Skin: Negative for rash.   Neurological: Positive for headaches.   Psychiatric/Behavioral: Negative for confusion.     Objective:     Vital Signs (Most Recent):  Temp: 99.7 °F (37.6 °C) (01/07/22 2049)  Pulse: 103 (01/08/22 0021)  Resp: 20 (01/08/22 0021)  BP: 106/67 (01/08/22 0021)  SpO2: 100 % (01/08/22 0021) Vital Signs (24h Range):  Temp:  [99.7 °F (37.6 °C)] 99.7 °F (37.6 °C)  Pulse:  [103-132] 103  Resp:  [18-24] 20  SpO2:  [96 %-100 %] 100 %  BP: (106-143)/(67-73) 106/67        There is no height or weight on file to calculate BMI.    Physical Exam  Constitutional:       General: She is not in acute distress.     Appearance: Normal appearance. She is normal weight. She is not ill-appearing.   HENT:      Head: Normocephalic and atraumatic.   Eyes:      Extraocular Movements: Extraocular movements intact.      Pupils: Pupils are equal, round, and reactive to light.   Cardiovascular:      Rate and Rhythm: Normal rate.      Pulses: Normal pulses.      Heart sounds: No murmur heard.      Pulmonary:      Effort: Pulmonary effort is normal. No respiratory distress.      Breath sounds: Normal breath sounds. No wheezing.   Abdominal:      General: Abdomen is flat. Bowel sounds are  normal.      Palpations: Abdomen is soft.   Musculoskeletal:         General: No swelling or tenderness.   Skin:     General: Skin is warm and dry.      Capillary Refill: Capillary refill takes less than 2 seconds.      Findings: No rash.   Neurological:      General: No focal deficit present.      Mental Status: She is alert and oriented to person, place, and time. Mental status is at baseline.      Cranial Nerves: No cranial nerve deficit.   Psychiatric:         Mood and Affect: Mood normal.         Behavior: Behavior normal.         Thought Content: Thought content normal.         Judgment: Judgment normal.          Significant Labs:   All pertinent labs within the past 24 hours have been reviewed.  CBC:   Recent Labs   Lab 01/07/22  2220   WBC 12.74*   HGB 13.2   HCT 40.0        CMP:   Recent Labs   Lab 01/07/22  2220   *   K 3.0*      CO2 20*   GLU 80   BUN 7   CREATININE 0.8   CALCIUM 9.2   PROT 7.8   ALBUMIN 3.8   BILITOT 0.7   ALKPHOS 71   AST 18   ALT 10   ANIONGAP 13   EGFRNONAA >60.0       Significant Imaging: I have reviewed all pertinent imaging results/findings within the past 24 hours.    Assessment/Plan:     COVID-19  COVID-19 Virus Infection  Viral Pneumonia due to COVID-19  - COVID-19 testing: Collection Date: 9/24/2021 Collection Time:   2:03 PM   Patient is identified as High risk for severe complications of COVID 19 based on COVID risk score of 0   Initiate standard COVID protocols; COVID-19 testing Collection Date: 9/24/2021 Collection Time:   2:03 PM ,Infection Control notification  and isolation- respiratory, contact and droplet per protocol    Diagnostics: (leukopenia, hyponatremia, hyperferritinemia, elevated troponin, elevated d-dimer, age, and comorbidities are significant predictors of poor clinical outcome)  CBC, CMP, Ferritin and CRP    Management: Continuous cardiac monitoring.     Patient does not require O2 at this time, continue to monitor  She is low risk at  this time    Advance Care Planning  Current advance care plan has not been discussed with patient/family/POA and patient currently wishes Full Code.    Epilepsy  Patient given loading dose of Keppra 2g in ED  Currently stable  Continue Keppra 500mg BID  Consult Neurology in am for dose adjustment        VTE Risk Mitigation (From admission, onward)    None             Stan Zepeda MD  Department of Hospital Medicine   [facility] - Emergency Dept

## 2022-01-09 PROBLEM — N39.0 UTI (URINARY TRACT INFECTION): Status: ACTIVE | Noted: 2022-01-09

## 2022-01-09 PROBLEM — E87.6 HYPOKALEMIA: Status: ACTIVE | Noted: 2022-01-09

## 2022-01-09 LAB
ALBUMIN SERPL BCP-MCNC: 3.1 G/DL (ref 3.5–5.2)
ALP SERPL-CCNC: 76 U/L (ref 55–135)
ALT SERPL W/O P-5'-P-CCNC: 8 U/L (ref 10–44)
ANION GAP SERPL CALC-SCNC: 11 MMOL/L (ref 8–16)
AST SERPL-CCNC: 12 U/L (ref 10–40)
B-HCG UR QL: NEGATIVE
BASOPHILS # BLD AUTO: 0.01 K/UL (ref 0–0.2)
BASOPHILS NFR BLD: 0.1 % (ref 0–1.9)
BILIRUB SERPL-MCNC: 0.9 MG/DL (ref 0.1–1)
BUN SERPL-MCNC: 6 MG/DL (ref 6–20)
CALCIUM SERPL-MCNC: 8.9 MG/DL (ref 8.7–10.5)
CHLORIDE SERPL-SCNC: 100 MMOL/L (ref 95–110)
CO2 SERPL-SCNC: 23 MMOL/L (ref 23–29)
CREAT SERPL-MCNC: 0.8 MG/DL (ref 0.5–1.4)
DIFFERENTIAL METHOD: ABNORMAL
EOSINOPHIL # BLD AUTO: 0 K/UL (ref 0–0.5)
EOSINOPHIL NFR BLD: 0.1 % (ref 0–8)
ERYTHROCYTE [DISTWIDTH] IN BLOOD BY AUTOMATED COUNT: 13.8 % (ref 11.5–14.5)
EST. GFR  (AFRICAN AMERICAN): >60 ML/MIN/1.73 M^2
EST. GFR  (NON AFRICAN AMERICAN): >60 ML/MIN/1.73 M^2
GLUCOSE SERPL-MCNC: 79 MG/DL (ref 70–110)
HCT VFR BLD AUTO: 34.1 % (ref 37–48.5)
HGB BLD-MCNC: 11.2 G/DL (ref 12–16)
IMM GRANULOCYTES # BLD AUTO: 0.04 K/UL (ref 0–0.04)
IMM GRANULOCYTES NFR BLD AUTO: 0.4 % (ref 0–0.5)
LYMPHOCYTES # BLD AUTO: 2.2 K/UL (ref 1–4.8)
LYMPHOCYTES NFR BLD: 19.7 % (ref 18–48)
MAGNESIUM SERPL-MCNC: 2 MG/DL (ref 1.6–2.6)
MCH RBC QN AUTO: 29.1 PG (ref 27–31)
MCHC RBC AUTO-ENTMCNC: 32.8 G/DL (ref 32–36)
MCV RBC AUTO: 89 FL (ref 82–98)
MONOCYTES # BLD AUTO: 1.4 K/UL (ref 0.3–1)
MONOCYTES NFR BLD: 12.1 % (ref 4–15)
NEUTROPHILS # BLD AUTO: 7.5 K/UL (ref 1.8–7.7)
NEUTROPHILS NFR BLD: 67.6 % (ref 38–73)
NRBC BLD-RTO: 0 /100 WBC
PLATELET # BLD AUTO: 311 K/UL (ref 150–450)
PMV BLD AUTO: 8.6 FL (ref 9.2–12.9)
POTASSIUM SERPL-SCNC: 3.1 MMOL/L (ref 3.5–5.1)
POTASSIUM SERPL-SCNC: 4 MMOL/L (ref 3.5–5.1)
PROT SERPL-MCNC: 6.7 G/DL (ref 6–8.4)
RBC # BLD AUTO: 3.85 M/UL (ref 4–5.4)
SODIUM SERPL-SCNC: 134 MMOL/L (ref 136–145)
WBC # BLD AUTO: 11.15 K/UL (ref 3.9–12.7)

## 2022-01-09 PROCEDURE — 83735 ASSAY OF MAGNESIUM: CPT | Performed by: INTERNAL MEDICINE

## 2022-01-09 PROCEDURE — 36415 COLL VENOUS BLD VENIPUNCTURE: CPT | Performed by: INTERNAL MEDICINE

## 2022-01-09 PROCEDURE — 81025 URINE PREGNANCY TEST: CPT | Performed by: INTERNAL MEDICINE

## 2022-01-09 PROCEDURE — 63600175 PHARM REV CODE 636 W HCPCS: Performed by: INTERNAL MEDICINE

## 2022-01-09 PROCEDURE — 84132 ASSAY OF SERUM POTASSIUM: CPT | Performed by: INTERNAL MEDICINE

## 2022-01-09 PROCEDURE — 96365 THER/PROPH/DIAG IV INF INIT: CPT | Performed by: EMERGENCY MEDICINE

## 2022-01-09 PROCEDURE — 99225 PR SUBSEQUENT OBSERVATION CARE,LEVEL II: ICD-10-PCS | Mod: ,,, | Performed by: INTERNAL MEDICINE

## 2022-01-09 PROCEDURE — 96361 HYDRATE IV INFUSION ADD-ON: CPT | Performed by: EMERGENCY MEDICINE

## 2022-01-09 PROCEDURE — 80053 COMPREHEN METABOLIC PANEL: CPT | Performed by: INTERNAL MEDICINE

## 2022-01-09 PROCEDURE — 99225 PR SUBSEQUENT OBSERVATION CARE,LEVEL II: CPT | Mod: ,,, | Performed by: INTERNAL MEDICINE

## 2022-01-09 PROCEDURE — G0378 HOSPITAL OBSERVATION PER HR: HCPCS

## 2022-01-09 PROCEDURE — 96376 TX/PRO/DX INJ SAME DRUG ADON: CPT | Performed by: EMERGENCY MEDICINE

## 2022-01-09 PROCEDURE — 25000003 PHARM REV CODE 250: Performed by: STUDENT IN AN ORGANIZED HEALTH CARE EDUCATION/TRAINING PROGRAM

## 2022-01-09 PROCEDURE — 85025 COMPLETE CBC W/AUTO DIFF WBC: CPT | Performed by: STUDENT IN AN ORGANIZED HEALTH CARE EDUCATION/TRAINING PROGRAM

## 2022-01-09 PROCEDURE — 25000003 PHARM REV CODE 250: Performed by: INTERNAL MEDICINE

## 2022-01-09 PROCEDURE — 94761 N-INVAS EAR/PLS OXIMETRY MLT: CPT

## 2022-01-09 RX ORDER — ALBUTEROL SULFATE 90 UG/1
2 AEROSOL, METERED RESPIRATORY (INHALATION) EVERY 4 HOURS PRN
Status: DISCONTINUED | OUTPATIENT
Start: 2022-01-09 | End: 2022-01-11 | Stop reason: HOSPADM

## 2022-01-09 RX ADMIN — POTASSIUM BICARBONATE 35 MEQ: 391 TABLET, EFFERVESCENT ORAL at 11:01

## 2022-01-09 RX ADMIN — LEVETIRACETAM 750 MG: 500 INJECTION, SOLUTION INTRAVENOUS at 08:01

## 2022-01-09 RX ADMIN — Medication 500 MG: at 09:01

## 2022-01-09 RX ADMIN — CEFTRIAXONE 1 G: 1 INJECTION, POWDER, FOR SOLUTION INTRAMUSCULAR; INTRAVENOUS at 04:01

## 2022-01-09 RX ADMIN — Medication 500 MG: at 08:01

## 2022-01-09 RX ADMIN — LEVETIRACETAM 750 MG: 500 INJECTION, SOLUTION INTRAVENOUS at 09:01

## 2022-01-09 RX ADMIN — POTASSIUM BICARBONATE 35 MEQ: 391 TABLET, EFFERVESCENT ORAL at 09:01

## 2022-01-09 RX ADMIN — MULTIPLE VITAMINS W/ MINERALS TAB 1 TABLET: TAB at 08:01

## 2022-01-09 RX ADMIN — ALBUTEROL SULFATE 2 PUFF: 108 INHALANT RESPIRATORY (INHALATION) at 01:01

## 2022-01-09 RX ADMIN — SODIUM CHLORIDE: 0.9 INJECTION, SOLUTION INTRAVENOUS at 09:01

## 2022-01-09 NOTE — ASSESSMENT & PLAN NOTE
COVID-19 Virus Infection  Viral Pneumonia due to COVID-19  - COVID-19 testing: Collection Date: 9/24/2021 Collection Time:   2:03 PM   Patient is identified as Low risk for severe complications of COVID 19 based on COVID risk score of 0   Initiate standard COVID protocols; COVID-19 testing Collection Date: 9/24/2021 Collection Time:   2:03 PM ,Infection Control notification  and isolation- respiratory, contact and droplet per protocol    Diagnostics: (leukopenia, hyponatremia, hyperferritinemia, elevated troponin, elevated d-dimer, age, and comorbidities are significant predictors of poor clinical outcome)  CBC, CMP, Ferritin and CRP    Management: Continuous cardiac monitoring.     Patient does not require O2 at this time, continue to monitor  She is low risk at this time    Advance Care Planning  Current advance care plan has not been discussed with patient/family/POA and patient currently wishes Full Code.   -continue multivitamin  -monitor for changes in status  -other than fever, she remains asymptomatic at present.  No respiratory issues and no diarrhea.

## 2022-01-09 NOTE — ASSESSMENT & PLAN NOTE
-patient reports she did not like the way the phenytoin made her feel, and she does not want to go back to that  -she would like to continue on with Keppra  -appreciate Neurology input  -monitor her response on Keppra  -EEG attempted, but she has sew-ins but she did not want to remove and EEG could not be done  -she appears to be tolerating the Keppra without difficulty  -no seizures so far today  -if she remains stable on January 10, consider discharge

## 2022-01-09 NOTE — PLAN OF CARE
[facility]  Neurology  Plan of Care    Neurology consulted for breakthrough seizure management in Mary Gilmore, a 21 y.o. female with history of epilepsy admitted with witnessed breakthrough seizures in setting of COVID infection. Followed by Neurology at [facility] but does not remember name of Primary Neurologist; care everywhere without documentation. Patient states she is adherent to current anti seizure medication regimen (phenytoin 100 mg three times a day and IV phenytoin infusions with unknown frequency or dose). She notes feeling generally unwell the past few days as well as 2 breakthrough seizures prior to Emergency Department presentation. Per primary team history and physical documentation the patients mother states Ms Gilmore was post ictal following her seizure but did not display tongue biting or incontinence. Following her seizure the patient had body aches, chills, and one episode of vomiting.    ED evaluation notable for hypertension, tachycardia, and one brief episode of shaking with extremity movements lasting a couple seconds, however, no post ictal period. Workup notable for positive COVID testing. Patient managed with levetiracetam 2g loading dose and 500 mg two times a day initially. Increased to 750 mg BID following 1 seizure like event witnessed by nursing staff while admitted; no further seizure events following. EEG recommended, however, patient refused due to sew in's.     No acute or concerning events noted by overnight staff. Hypotensive, otherwise vital signs are stable and within normal limits. Patient is conscious and comfortable.    On physical exam patient is alert and oriented x 3. Attention and concentration normal. Follows 1 step commands. Cranial nerves II through XII intact. Strength 5/5 throughout. Patient has normal DTR's. Light touch normal in upper and lower extremities. Finger to nose normal.     Diagnostics reviewed; Na 134, K+ 3.1, Urine culture with  presumptive E.Coli, identification and sensitivity     No pending diagnostics to follow up on.    Assessment  *Seizures  Known epileptic patient (unverified) with breakthrough seizure likely secondary to underlying COVID infection. Back to neurologic baseline. 1 seizure during hospital stay; levetiracetam increased to 750 mg BID. No further seizures overnight. Initially reluctant to start/increase keppra dose, however, patient states no adverse effects with keppra and would like to continue. Phenytoin level <1.8. Refused EEG. Continue current keppra regimen     Recommendations  Diagnostic:  - spot EEG; currently refusing  - if unable to obtain outside records, consider MRI brain epilepsy protocol. Not urgent, no focal neurologic deficits on exam and patient is back to baseline.  Therapeutic  - continue levetiracetam to 750 mg BID    - discontinue phenytoin; level 1.8 at 01/07/2022 during admission. Patient subtherapeutic. Average half life 22 hrs (7 - 42 hrs range), therefore, patient is likely safe to discontinue. Furthermore, patient is of child bearing age as well as     - Continue to monitor with seizure precautions due to concern for seizures in setting of phenytoin withdrawal.   -  patient on seizure precautions until six months seizure free including no driving or operating heavy machinery, no submerging in water, take showers instead of baths whenever possible, do not care for children alone, caution when using hot objects especially cooking and do not scale ladders or heights unsupported or unaccompanied.   - Follow up with outpatient Neurologist at [facility] or ambulatory referral to [facility]  - further medical management per Hospital Medicine      Discussed patient with staff.   Neurology sign off. Please contact Neurology for any questions or concerns.       Pedro Irene MD  Neurology  [facility] [place], [place]

## 2022-01-09 NOTE — SUBJECTIVE & OBJECTIVE
Interval History:  She feels better today.  She is tolerating the Keppra and has no further seizures so far today.  Lower back discomfort persists.  Urine culture from January 8 is growing presumptive E coli, and Rocephin is ordered.  No respiratory symptoms, and she remains on room air.    Review of Systems   Constitutional: Negative for diaphoresis.        Fever improved   HENT: Negative for congestion and sore throat.    Eyes: Negative for photophobia and visual disturbance.   Respiratory: Negative for cough, shortness of breath and wheezing.    Cardiovascular: Negative for chest pain and palpitations.   Gastrointestinal: Negative for abdominal pain, nausea and vomiting.   Genitourinary: Negative for dysuria and hematuria.   Musculoskeletal: Positive for back pain.   Skin: Negative for pallor and rash.   Neurological: Negative for facial asymmetry, speech difficulty and headaches.     Objective:     Vital Signs (Most Recent):  Temp: 98.1 °F (36.7 °C) (01/09/22 1145)  Pulse: 92 (01/09/22 1145)  Resp: 19 (01/09/22 1145)  BP: (!) 98/56 (01/09/22 1145)  SpO2: 100 % (01/09/22 1145) Vital Signs (24h Range):  Temp:  [98.1 °F (36.7 °C)-101.6 °F (38.7 °C)] 98.1 °F (36.7 °C)  Pulse:  [] 92  Resp:  [16-20] 19  SpO2:  [97 %-100 %] 100 %  BP: ()/(56-63) 98/56        There is no height or weight on file to calculate BMI.    Intake/Output Summary (Last 24 hours) at 1/9/2022 1601  Last data filed at 1/9/2022 1200  Gross per 24 hour   Intake 652.6 ml   Output --   Net 652.6 ml      Physical Exam  Vitals reviewed.   Constitutional:       General: She is not in acute distress.  HENT:      Head: Normocephalic and atraumatic.   Eyes:      Extraocular Movements: Extraocular movements intact.      Conjunctiva/sclera: Conjunctivae normal.   Cardiovascular:      Rate and Rhythm: Normal rate and regular rhythm.   Pulmonary:      Effort: Pulmonary effort is normal.      Breath sounds: No wheezing or rales.   Abdominal:       General: Bowel sounds are normal.      Palpations: Abdomen is soft.      Tenderness: There is no abdominal tenderness.   Musculoskeletal:         General: No swelling or tenderness.   Skin:     General: Skin is warm and dry.   Neurological:      General: No focal deficit present.      Mental Status: She is alert and oriented to person, place, and time.      Cranial Nerves: No cranial nerve deficit.      Motor: No weakness.         Significant Labs:   All pertinent labs within the past 24 hours have been reviewed.  CBC:   Recent Labs   Lab 01/07/22 2220 01/08/22  0149 01/09/22  0516   WBC 12.74* 11.25 11.15   HGB 13.2 11.6* 11.2*   HCT 40.0 34.4* 34.1*    294 311     CMP:   Recent Labs   Lab 01/07/22 2220 01/09/22  0516   * 134*   K 3.0* 3.1*    100   CO2 20* 23   GLU 80 79   BUN 7 6   CREATININE 0.8 0.8   CALCIUM 9.2 8.9   PROT 7.8 6.7   ALBUMIN 3.8 3.1*   BILITOT 0.7 0.9   ALKPHOS 71 76   AST 18 12   ALT 10 8*   ANIONGAP 13 11   EGFRNONAA >60.0 >60.0     Urine Culture:   Recent Labs   Lab 01/08/22  0026   LABURIN PRESUMPTIVE E COLI  >100,000 cfu/ml  Identification and susceptibility pending  *

## 2022-01-09 NOTE — ASSESSMENT & PLAN NOTE
-she received potassium replacement this morning  -monitor the trend and replace further as needed

## 2022-01-09 NOTE — PROGRESS NOTES
Elmer Carranza - Neurosurgery ([facility])  [facility] Medicine  Progress Note    Patient Name: Mary Gilmore  MRN: [id number]  Patient Class: OP- Observation   Admission Date: 1/7/2022  Length of Stay: 0 days  Attending Physician: Cresencio Oscar MD  Primary Care Provider: Philippe Viera Jr, MD        Subjective:     Principal Problem:Epilepsy        HPI:  21 year old AAF with a PMH of Epilepsy on Dilantin [place] [facility] [place] of break thru seizures for 2 days. Patient was working out this morning between 6am and 9am. After her workout, went to shower and started to feel lightheaded. Lost consciousness and friend at gym witnessed a tonic clonic seizure. No incontinence or tongue biting. Has her normal post-ictal headache. Mother reports a 20 minute post-ictal period. Had a similar episode yesterday at home. Concern her PO medications are not as effective as the IV infusions she was getting 3 months ago (of dilantin). Reports not feeling well over the past few days and started her menstrual cycles yesterday. Did not eat breakfast this morning prior to working out and took her medication a little late today, but no missed medications. One episode of vomiting yesterday. No fevers/chills, chest pain, sore throat, coughing, congestion, abdominal pain, or urinary symptoms.     Patient also tested positive for COVID. She states she has had headaches for the past 3 days and has been around other people who were sick but no one has been confirmed COVID positive. She states she currently has body aches and chills but she believes she has these symptoms after having seizures. She denies SOB.      PMH: Epilepsy on Dilantin, eczema  PSH: n/a  FH: n/a  SH: n/a      Overview/Hospital Course:  Admitted with seizures and COVID.  COVID finding was incidental other than her fever.  No respiratory compromise.  She has been treated with Keppra.  She said she did not like the way the phenytoin made her feel, and she would not go back  to that.  She wanted to stay with the Keppra.  She had since arriving to her hospital room today.  It resolved without intervention and her postictal period was fairly short.  Keppra dosing was increased to 750 mg twice a day.  Urine culture from January 8 grew presumptive E coli.  Rocephin is being initiated.  Temperature improved on January 9 with no further seizure activity so far.      Interval History:  She feels better today.  She is tolerating the Keppra and has no further seizures so far today.  Lower back discomfort persists.  Urine culture from January 8 is growing presumptive E coli, and Rocephin is ordered.  No respiratory symptoms, and she remains on room air.    Review of Systems   Constitutional: Negative for diaphoresis.        Fever improved   HENT: Negative for congestion and sore throat.    Eyes: Negative for photophobia and visual disturbance.   Respiratory: Negative for cough, shortness of breath and wheezing.    Cardiovascular: Negative for chest pain and palpitations.   Gastrointestinal: Negative for abdominal pain, nausea and vomiting.   Genitourinary: Negative for dysuria and hematuria.   Musculoskeletal: Positive for back pain.   Skin: Negative for pallor and rash.   Neurological: Negative for facial asymmetry, speech difficulty and headaches.     Objective:     Vital Signs (Most Recent):  Temp: 98.1 °F (36.7 °C) (01/09/22 1145)  Pulse: 92 (01/09/22 1145)  Resp: 19 (01/09/22 1145)  BP: (!) 98/56 (01/09/22 1145)  SpO2: 100 % (01/09/22 1145) Vital Signs (24h Range):  Temp:  [98.1 °F (36.7 °C)-101.6 °F (38.7 °C)] 98.1 °F (36.7 °C)  Pulse:  [] 92  Resp:  [16-20] 19  SpO2:  [97 %-100 %] 100 %  BP: ()/(56-63) 98/56        There is no height or weight on file to calculate BMI.    Intake/Output Summary (Last 24 hours) at 1/9/2022 1601  Last data filed at 1/9/2022 1200  Gross per 24 hour   Intake 652.6 ml   Output --   Net 652.6 ml      Physical Exam  Vitals reviewed.   Constitutional:        General: She is not in acute distress.  HENT:      Head: Normocephalic and atraumatic.   Eyes:      Extraocular Movements: Extraocular movements intact.      Conjunctiva/sclera: Conjunctivae normal.   Cardiovascular:      Rate and Rhythm: Normal rate and regular rhythm.   Pulmonary:      Effort: Pulmonary effort is normal.      Breath sounds: No wheezing or rales.   Abdominal:      General: Bowel sounds are normal.      Palpations: Abdomen is soft.      Tenderness: There is no abdominal tenderness.   Musculoskeletal:         General: No swelling or tenderness.   Skin:     General: Skin is warm and dry.   Neurological:      General: No focal deficit present.      Mental Status: She is alert and oriented to person, place, and time.      Cranial Nerves: No cranial nerve deficit.      Motor: No weakness.         Significant Labs:   All pertinent labs within the past 24 hours have been reviewed.  CBC:   Recent Labs   Lab 01/07/22 2220 01/08/22  0149 01/09/22  0516   WBC 12.74* 11.25 11.15   HGB 13.2 11.6* 11.2*   HCT 40.0 34.4* 34.1*    294 311     CMP:   Recent Labs   Lab 01/07/22 2220 01/09/22  0516   * 134*   K 3.0* 3.1*    100   CO2 20* 23   GLU 80 79   BUN 7 6   CREATININE 0.8 0.8   CALCIUM 9.2 8.9   PROT 7.8 6.7   ALBUMIN 3.8 3.1*   BILITOT 0.7 0.9   ALKPHOS 71 76   AST 18 12   ALT 10 8*   ANIONGAP 13 11   EGFRNONAA >60.0 >60.0     Urine Culture:   Recent Labs   Lab 01/08/22  0026   LABURIN PRESUMPTIVE E COLI  >100,000 cfu/ml  Identification and susceptibility pending  *           Assessment/Plan:      * Epilepsy  -patient reports she did not like the way the phenytoin made her feel, and she does not want to go back to that  -she would like to continue on with Keppra  -appreciate Neurology input  -monitor her response on Keppra  -EEG attempted, but she has sew-ins but she did not want to remove and EEG could not be done  -she appears to be tolerating the Keppra without difficulty  -no  seizures so far today  -if she remains stable on January 10, consider discharge      Hypokalemia  -she received potassium replacement this morning  -monitor the trend and replace further as needed      UTI (urinary tract infection)  -presumptive E coli on urine culture  -I am uncertain if this is contributing to her low back discomfort.  She has no dysuria  -she reports no allergies with antibiotics.  Will treat with Rocephin initially        COVID-19  COVID-19 Virus Infection  Viral Pneumonia due to COVID-19  - COVID-19 testing: Collection Date: 9/24/2021 Collection Time:   2:03 PM   Patient is identified as Low risk for severe complications of COVID 19 based on COVID risk score of 0   Initiate standard COVID protocols; COVID-19 testing Collection Date: 9/24/2021 Collection Time:   2:03 PM ,Infection Control notification  and isolation- respiratory, contact and droplet per protocol    Diagnostics: (leukopenia, hyponatremia, hyperferritinemia, elevated troponin, elevated d-dimer, age, and comorbidities are significant predictors of poor clinical outcome)  CBC, CMP, Ferritin and CRP    Management: Continuous cardiac monitoring.     Patient does not require O2 at this time, continue to monitor  She is low risk at this time    Advance Care Planning  Current advance care plan has not been discussed with patient/family/POA and patient currently wishes Full Code.  -continue multivitamin  -monitor for changes in status  -other than fever, she remains asymptomatic at present.  No respiratory issues and no diarrhea.      VTE Risk Mitigation (From admission, onward)    None          Discharge Planning   NESHA: 1/10/2022     Code Status: Full Code   Is the patient medically ready for discharge?: No    Reason for patient still in hospital (select all that apply): Treatment         I spoke with her mother by phone to update her on current status and plan    ANASTASIA Oscar MD  Department of Hospital Medicine   Elmer Carranza -  Neurosurgery ([facility])

## 2022-01-09 NOTE — CARE UPDATE
RAPID RESPONSE NURSE CHART REVIEW        Chart Reviewed: 01/09/2022, 12:36 AM    MRN: [id number]  Bed: 924/924 A    Dx: Epilepsy    Mary Gilmore has no past medical history on file.    Last VS: /63 (BP Location: Left arm, Patient Position: Lying)   Pulse 94   Temp (!) 101.6 °F (38.7 °C)   Resp 16   SpO2 100%     24H Vital Sign Range:  Temp:  [98 °F (36.7 °C)-102.4 °F (39.1 °C)]   Pulse:  []   Resp:  [16-23]   BP: ()/(50-63)   SpO2:  [96 %-100 %]     Level of Consciousness (AVPU): alert    Recent Labs     01/07/22  2220 01/08/22  0149   WBC 12.74* 11.25   HGB 13.2 11.6*   HCT 40.0 34.4*    294       Recent Labs     01/07/22  2220   *   K 3.0*      CO2 20*   CREATININE 0.8   GLU 80   MG 1.9        No results for input(s): PH, PCO2, PO2, HCO3, POCSATURATED, BE in the last 72 hours.     OXYGEN:  Flow (L/min): 2 (applied)     O2 Device (Oxygen Therapy): nasal cannula (applied)    MEWS score: 3    Charge RN, Miryam contacted. No concerns verbalized at this time. Instructed to call [phone] for further concerns or assistance.    Bette Azar RN

## 2022-01-09 NOTE — ASSESSMENT & PLAN NOTE
-presumptive E coli on urine culture  -I am uncertain if this is contributing to her low back discomfort.  She has no dysuria  -she reports no allergies with antibiotics.  Will treat with Rocephin initially

## 2022-01-10 LAB
ALBUMIN SERPL BCP-MCNC: 2.9 G/DL (ref 3.5–5.2)
ALP SERPL-CCNC: 50 U/L (ref 55–135)
ALT SERPL W/O P-5'-P-CCNC: 7 U/L (ref 10–44)
ANION GAP SERPL CALC-SCNC: 9 MMOL/L (ref 8–16)
AST SERPL-CCNC: 11 U/L (ref 10–40)
BASOPHILS # BLD AUTO: 0.01 K/UL (ref 0–0.2)
BASOPHILS NFR BLD: 0.1 % (ref 0–1.9)
BILIRUB SERPL-MCNC: 0.4 MG/DL (ref 0.1–1)
BUN SERPL-MCNC: 9 MG/DL (ref 6–20)
CALCIUM SERPL-MCNC: 8.6 MG/DL (ref 8.7–10.5)
CHLORIDE SERPL-SCNC: 107 MMOL/L (ref 95–110)
CO2 SERPL-SCNC: 22 MMOL/L (ref 23–29)
CREAT SERPL-MCNC: 0.7 MG/DL (ref 0.5–1.4)
D DIMER PPP IA.FEU-MCNC: 0.63 MG/L FEU
DIFFERENTIAL METHOD: ABNORMAL
EOSINOPHIL # BLD AUTO: 0 K/UL (ref 0–0.5)
EOSINOPHIL NFR BLD: 0.5 % (ref 0–8)
ERYTHROCYTE [DISTWIDTH] IN BLOOD BY AUTOMATED COUNT: 13.9 % (ref 11.5–14.5)
EST. GFR  (AFRICAN AMERICAN): >60 ML/MIN/1.73 M^2
EST. GFR  (NON AFRICAN AMERICAN): >60 ML/MIN/1.73 M^2
FERRITIN SERPL-MCNC: 92 NG/ML (ref 20–300)
GLUCOSE SERPL-MCNC: 80 MG/DL (ref 70–110)
HCT VFR BLD AUTO: 33 % (ref 37–48.5)
HGB BLD-MCNC: 10.7 G/DL (ref 12–16)
IMM GRANULOCYTES # BLD AUTO: 0.02 K/UL (ref 0–0.04)
IMM GRANULOCYTES NFR BLD AUTO: 0.3 % (ref 0–0.5)
LDH SERPL L TO P-CCNC: 147 U/L (ref 110–260)
LYMPHOCYTES # BLD AUTO: 1.9 K/UL (ref 1–4.8)
LYMPHOCYTES NFR BLD: 26 % (ref 18–48)
MCH RBC QN AUTO: 29.2 PG (ref 27–31)
MCHC RBC AUTO-ENTMCNC: 32.4 G/DL (ref 32–36)
MCV RBC AUTO: 90 FL (ref 82–98)
MONOCYTES # BLD AUTO: 0.8 K/UL (ref 0.3–1)
MONOCYTES NFR BLD: 10.4 % (ref 4–15)
NEUTROPHILS # BLD AUTO: 4.6 K/UL (ref 1.8–7.7)
NEUTROPHILS NFR BLD: 62.7 % (ref 38–73)
NRBC BLD-RTO: 0 /100 WBC
PLATELET # BLD AUTO: 292 K/UL (ref 150–450)
PMV BLD AUTO: 8.7 FL (ref 9.2–12.9)
POTASSIUM SERPL-SCNC: 4.1 MMOL/L (ref 3.5–5.1)
PROT SERPL-MCNC: 5.9 G/DL (ref 6–8.4)
RBC # BLD AUTO: 3.66 M/UL (ref 4–5.4)
SODIUM SERPL-SCNC: 138 MMOL/L (ref 136–145)
WBC # BLD AUTO: 7.28 K/UL (ref 3.9–12.7)

## 2022-01-10 PROCEDURE — 25000003 PHARM REV CODE 250: Performed by: INTERNAL MEDICINE

## 2022-01-10 PROCEDURE — 83615 LACTATE (LD) (LDH) ENZYME: CPT | Performed by: STUDENT IN AN ORGANIZED HEALTH CARE EDUCATION/TRAINING PROGRAM

## 2022-01-10 PROCEDURE — 96366 THER/PROPH/DIAG IV INF ADDON: CPT | Performed by: EMERGENCY MEDICINE

## 2022-01-10 PROCEDURE — 94761 N-INVAS EAR/PLS OXIMETRY MLT: CPT

## 2022-01-10 PROCEDURE — 85025 COMPLETE CBC W/AUTO DIFF WBC: CPT | Performed by: STUDENT IN AN ORGANIZED HEALTH CARE EDUCATION/TRAINING PROGRAM

## 2022-01-10 PROCEDURE — 85379 FIBRIN DEGRADATION QUANT: CPT | Performed by: STUDENT IN AN ORGANIZED HEALTH CARE EDUCATION/TRAINING PROGRAM

## 2022-01-10 PROCEDURE — 82728 ASSAY OF FERRITIN: CPT | Performed by: STUDENT IN AN ORGANIZED HEALTH CARE EDUCATION/TRAINING PROGRAM

## 2022-01-10 PROCEDURE — 96361 HYDRATE IV INFUSION ADD-ON: CPT | Performed by: EMERGENCY MEDICINE

## 2022-01-10 PROCEDURE — 96372 THER/PROPH/DIAG INJ SC/IM: CPT | Mod: 59 | Performed by: EMERGENCY MEDICINE

## 2022-01-10 PROCEDURE — 36415 COLL VENOUS BLD VENIPUNCTURE: CPT | Performed by: STUDENT IN AN ORGANIZED HEALTH CARE EDUCATION/TRAINING PROGRAM

## 2022-01-10 PROCEDURE — 63600175 PHARM REV CODE 636 W HCPCS: Performed by: INTERNAL MEDICINE

## 2022-01-10 PROCEDURE — 99225 PR SUBSEQUENT OBSERVATION CARE,LEVEL II: ICD-10-PCS | Mod: ,,, | Performed by: INTERNAL MEDICINE

## 2022-01-10 PROCEDURE — 80053 COMPREHEN METABOLIC PANEL: CPT | Performed by: INTERNAL MEDICINE

## 2022-01-10 PROCEDURE — 99225 PR SUBSEQUENT OBSERVATION CARE,LEVEL II: CPT | Mod: ,,, | Performed by: INTERNAL MEDICINE

## 2022-01-10 PROCEDURE — 25000003 PHARM REV CODE 250: Performed by: STUDENT IN AN ORGANIZED HEALTH CARE EDUCATION/TRAINING PROGRAM

## 2022-01-10 PROCEDURE — G0378 HOSPITAL OBSERVATION PER HR: HCPCS

## 2022-01-10 RX ORDER — ENOXAPARIN SODIUM 100 MG/ML
40 INJECTION SUBCUTANEOUS EVERY 24 HOURS
Status: DISCONTINUED | OUTPATIENT
Start: 2022-01-10 | End: 2022-01-11 | Stop reason: HOSPADM

## 2022-01-10 RX ORDER — LEVETIRACETAM 750 MG/1
750 TABLET ORAL 2 TIMES DAILY
Status: DISCONTINUED | OUTPATIENT
Start: 2022-01-10 | End: 2022-01-11 | Stop reason: HOSPADM

## 2022-01-10 RX ADMIN — CEFTRIAXONE 1 G: 1 INJECTION, POWDER, FOR SOLUTION INTRAMUSCULAR; INTRAVENOUS at 04:01

## 2022-01-10 RX ADMIN — ENOXAPARIN SODIUM 40 MG: 100 INJECTION SUBCUTANEOUS at 04:01

## 2022-01-10 RX ADMIN — MULTIPLE VITAMINS W/ MINERALS TAB 1 TABLET: TAB at 09:01

## 2022-01-10 RX ADMIN — Medication 500 MG: at 09:01

## 2022-01-10 RX ADMIN — Medication 500 MG: at 07:01

## 2022-01-10 RX ADMIN — LEVETIRACETAM 750 MG: 750 TABLET ORAL at 07:01

## 2022-01-10 RX ADMIN — LEVETIRACETAM 750 MG: 750 TABLET ORAL at 09:01

## 2022-01-10 RX ADMIN — SODIUM CHLORIDE 250 ML: 0.9 INJECTION, SOLUTION INTRAVENOUS at 09:01

## 2022-01-10 NOTE — ASSESSMENT & PLAN NOTE
COVID-19 Virus Infection  Viral Pneumonia due to COVID-19  - COVID-19 testing: Collection Date: 9/24/2021 Collection Time:   2:03 PM   Patient is identified as Low risk for severe complications of COVID 19 based on COVID risk score of 0   Initiate standard COVID protocols; COVID-19 testing Collection Date: 9/24/2021 Collection Time:   2:03 PM ,Infection Control notification  and isolation- respiratory, contact and droplet per protocol    Diagnostics: (leukopenia, hyponatremia, hyperferritinemia, elevated troponin, elevated d-dimer, age, and comorbidities are significant predictors of poor clinical outcome)  CBC, CMP, Ferritin and CRP    Management: Continuous cardiac monitoring.     Patient does not require O2 at this time, continue to monitor  She is low risk at this time    Advance Care Planning  Current advance care plan has not been discussed with patient/family/POA and patient currently wishes Full Code.   -continue multivitamin  -monitor for changes in status  -today she noted frontal headache, myalgias, and arthralgias.  No alteration in mentation and no further seizures  -will add DVT prophylaxis dose Lovenox, with seizure history, would avoid systemic anticoagulation if possible  -transient low blood pressure earlier today that has normalized now. Continue to monitor

## 2022-01-10 NOTE — NURSING
Home Oxygen Evaluation    Date Performed: 1/10/2022    1) Patient's Home O2 Sat on room air, while at rest: 100%        If O2 sats on room air at rest are 88% or below, patient qualifies. No additional testing needed. Document N/A in steps 2 and 3. If 89% or above, complete steps 2.      2) Patient's O2 Sat on room air while exercisin%        If O2 sats on room air while exercising remain 89% or above patient does not qualify, no further testing needed Document N/A in step 3. If O2 sats on room air while exercising are 88% or below, continue to step 3.      3) Patient's O2 Sat while exercising on O2: N/A at N/A LPM         (Must show improvement from #2 for patients to qualify)    If O2 sats improve on oxygen, patient qualifies for portable oxygen. If not, the patient does not qualify.

## 2022-01-10 NOTE — ASSESSMENT & PLAN NOTE
-patient reports she did not like the way the phenytoin made her feel, and she does not want to go back to that  -she would like to continue on with Keppra  -appreciate Neurology input  -monitor her response on Keppra  -EEG attempted, but she has sew-ins but she did not want to remove and EEG could not be done  -she appears to be tolerating the Keppra without difficulty  -no further seizures so far  -Keppra transitioned to oral  -if she is tolerating the oral Keppra with no further complications, consider discharge on January 11

## 2022-01-10 NOTE — SUBJECTIVE & OBJECTIVE
Interval History:  No seizure so far today.  She still has arthralgias and myalgias, but she has no dyspnea or cough.  Blood pressure was low earlier today but has improved.  Keppra switched to oral.    Review of Systems   Constitutional: Positive for appetite change. Negative for fever (So far today).   HENT: Negative for congestion and sore throat.    Eyes: Negative for photophobia and visual disturbance.   Respiratory: Negative for cough, shortness of breath and wheezing.    Cardiovascular: Negative for chest pain and palpitations.   Gastrointestinal: Positive for nausea ( occasional). Negative for abdominal pain and vomiting.   Genitourinary: Negative for dysuria and hematuria.   Musculoskeletal: Positive for arthralgias, back pain and myalgias.   Skin: Negative for pallor and rash.   Neurological: Negative for syncope, facial asymmetry, speech difficulty, weakness and light-headedness.        Mild frontal headache     Objective:     Vital Signs (Most Recent):  Temp: 96.5 °F (35.8 °C) (01/10/22 1545)  Pulse: 66 (01/10/22 1545)  Resp: 16 (01/10/22 0830)  BP: 117/89 (01/10/22 1545)  SpO2: 97 % (01/10/22 1545) Vital Signs (24h Range):  Temp:  [96.5 °F (35.8 °C)-98 °F (36.7 °C)] 96.5 °F (35.8 °C)  Pulse:  [61-86] 66  Resp:  [16-18] 16  SpO2:  [96 %-99 %] 97 %  BP: ()/(52-89) 117/89        There is no height or weight on file to calculate BMI.    Intake/Output Summary (Last 24 hours) at 1/10/2022 1702  Last data filed at 1/10/2022 1230  Gross per 24 hour   Intake 615.14 ml   Output --   Net 615.14 ml      Physical Exam  Vitals reviewed.   Constitutional:       General: She is not in acute distress.  HENT:      Head: Normocephalic and atraumatic.   Eyes:      Extraocular Movements: Extraocular movements intact.      Conjunctiva/sclera: Conjunctivae normal.   Cardiovascular:      Rate and Rhythm: Normal rate and regular rhythm.   Pulmonary:      Effort: Pulmonary effort is normal.      Breath sounds: No wheezing  or rhonchi.   Abdominal:      General: Bowel sounds are normal.      Palpations: Abdomen is soft.      Tenderness: There is no abdominal tenderness.   Musculoskeletal:         General: No swelling or tenderness.   Skin:     General: Skin is warm and dry.   Neurological:      General: No focal deficit present.      Mental Status: She is alert and oriented to person, place, and time.      Cranial Nerves: No cranial nerve deficit.      Motor: No weakness.         Significant Labs:   All pertinent labs within the past 24 hours have been reviewed.  CBC:   Recent Labs   Lab 01/09/22  0516 01/10/22  0714   WBC 11.15 7.28   HGB 11.2* 10.7*   HCT 34.1* 33.0*    292     CMP:   Recent Labs   Lab 01/09/22  0516 01/09/22  1613 01/10/22  0714   *  --  138   K 3.1* 4.0 4.1     --  107   CO2 23  --  22*   GLU 79  --  80   BUN 6  --  9   CREATININE 0.8  --  0.7   CALCIUM 8.9  --  8.6*   PROT 6.7  --  5.9*   ALBUMIN 3.1*  --  2.9*   BILITOT 0.9  --  0.4   ALKPHOS 76  --  50*   AST 12  --  11   ALT 8*  --  7*   ANIONGAP 11  --  9   EGFRNONAA >60.0  --  >60.0

## 2022-01-10 NOTE — PLAN OF CARE
Elmer Carranza - Neurosurgery ([facility])  Initial Discharge Assessment       Primary Care Provider: Philippe Viera Jr, MD  Admission Diagnosis: Seizure [R56.9]  Tachycardia [R00.0]  COVID-19 [U07.1]  Admission Date: 1/7/2022  Expected Discharge Date: 1/10/2022    CM spoke with patient at bedside to complete discharge planning assessment.  Patient is AAO x 4.  PT verified that all demographic information on face sheet is correct.  PT verified PCP -  Dr. Viera.  Pt verified primary health insurance is [employer] and secondary is marva.  Patient current with home health - no.  Has below listed DME.  POA and Living Will on file - no.  Patient on dialysis - no .  Patient on coumadin - no.  Patient is a 30-day readmission - no.  Patient currently has financial issues - no.  Patient will have transportation upon discharge from facility - family .  Patient ADLs - independent.  Patient lives -  With Henrietta haider [phone].   Discharge Planning Booklet given to patient/family and discussed.    All questions addressed.    Discharge Barriers Identified: None  Payor: MEDICAID / Plan: [employer] / Product Type: Managed Medicaid /   Extended Emergency Contact Information  Primary Emergency Contact: Henrietta Diane  Mobile Phone: [phone]  Relation: Mother  Discharge Plan A: Home with family  Discharge Plan B: Home with family,Community Services    [id number] [place] [address] [address]  AT [address] [facility]  [address] [facility] [address]  Phone: [phone] Fax: [phone]    Initial Assessment (most recent)     Adult Discharge Assessment - 01/10/22 1349        Discharge Assessment    Assessment Type Discharge Planning Assessment     Confirmed/corrected address, phone number and insurance Yes     Confirmed Demographics Correct on Facesheet     Source of Information patient     Communicated NESHA with patient/caregiver Yes     Reason For Admission Epilepsy      Lives With parent(s)     Facility Arrived From: home     Do you expect to return to your current living situation? Yes     Do you have help at home or someone to help you manage your care at home? Yes     Who are your caregiver(s) and their phone number(s)? Henrietta haider [phone]     Prior to hospitilization cognitive status: Alert/Oriented     Current cognitive status: Alert/Oriented     Walking or Climbing Stairs Difficulty none     Dressing/Bathing Difficulty none     Home Accessibility stairs to enter home     Number of Stairs, Main Entrance ten     Home Layout Able to live on 1st floor     Equipment Currently Used at Home none     Readmission within 30 days? No     Patient currently being followed by outpatient case management? No     Do you currently have service(s) that help you manage your care at home? No     Do you have any problems affording any of your prescribed medications? TBD     Who is going to help you get home at discharge? Henrietta haider [phone]     How do you get to doctors appointments? car, drives self;family or friend will provide     Are you on dialysis? No     Do you take coumadin? No     Discharge Plan A Home with family     Discharge Plan B Home with family;Community Services     DME Needed Upon Discharge  other (see comments)   tbd    Discharge Plan discussed with: Patient     Discharge Barriers Identified None        Relationship/Environment    Name(s) of Who Lives With Patient Henrietta Diane mother [phone]                  Tressa Gamino RN  Case Management  Ext: [phone]

## 2022-01-10 NOTE — PROGRESS NOTES
Elmer Carranza - Neurosurgery ([facility])  [facility] Medicine  Progress Note    Patient Name: Mary Gilmore  MRN: [id number]  Patient Class: OP- Observation   Admission Date: 1/7/2022  Length of Stay: 0 days  Attending Physician: Cresencio Oscar MD  Primary Care Provider: Philippe Viera Jr, MD        Subjective:     Principal Problem:Epilepsy        HPI:  21 year old AAF with a PMH of Epilepsy on Dilantin [place] [facility] [place] complaints of break thru seizures for 2 days. Patient was working out this morning between 6am and 9am. After her workout, went to shower and started to feel lightheaded. Lost consciousness and friend at gym witnessed a tonic clonic seizure. No incontinence or tongue biting. Has her normal post-ictal headache. Mother reports a 20 minute post-ictal period. Had a similar episode yesterday at home. Concern her PO medications are not as effective as the IV infusions she was getting 3 months ago (of dilantin). Reports not feeling well over the past few days and started her menstrual cycles yesterday. Did not eat breakfast this morning prior to working out and took her medication a little late today, but no missed medications. One episode of vomiting yesterday. No fevers/chills, chest pain, sore throat, coughing, congestion, abdominal pain, or urinary symptoms.     Patient also tested positive for COVID. She states she has had headaches for the past 3 days and has been around other people who were sick but no one has been confirmed COVID positive. She states she currently has body aches and chills but she believes she has these symptoms after having seizures. She denies SOB.      PMH: Epilepsy on Dilantin, eczema  PSH: n/a  FH: n/a  SH: n/a      Overview/Hospital Course:  Admitted with seizures and COVID.  COVID finding was incidental other than her fever.  No respiratory compromise.  She has been treated with Keppra.  She said she did not like the way the phenytoin made her feel, and she would not go back  to that.  She wanted to stay with the Keppra.  She had since arriving to her hospital room today.  It resolved without intervention and her postictal period was fairly short.  Keppra dosing was increased to 750 mg twice a day.  Urine culture from January 8 grew presumptive E coli.  Rocephin is being initiated.  Temperature improved on January 9 with no further seizure activity so far.  No seizures noted so far on January 10. She still has diffuse arthralgias and myalgias but no dyspnea.  Blood pressure was mildly low, but improved through the day.  Keppra was transition to oral.      Interval History:  No seizure so far today.  She still has arthralgias and myalgias, but she has no dyspnea or cough.  Blood pressure was low earlier today but has improved.  Keppra switched to oral.    Review of Systems   Constitutional: Positive for appetite change. Negative for fever (So far today).   HENT: Negative for congestion and sore throat.    Eyes: Negative for photophobia and visual disturbance.   Respiratory: Negative for cough, shortness of breath and wheezing.    Cardiovascular: Negative for chest pain and palpitations.   Gastrointestinal: Positive for nausea ( occasional). Negative for abdominal pain and vomiting.   Genitourinary: Negative for dysuria and hematuria.   Musculoskeletal: Positive for arthralgias, back pain and myalgias.   Skin: Negative for pallor and rash.   Neurological: Negative for syncope, facial asymmetry, speech difficulty, weakness and light-headedness.        Mild frontal headache     Objective:     Vital Signs (Most Recent):  Temp: 96.5 °F (35.8 °C) (01/10/22 1545)  Pulse: 66 (01/10/22 1545)  Resp: 16 (01/10/22 0830)  BP: 117/89 (01/10/22 1545)  SpO2: 97 % (01/10/22 1545) Vital Signs (24h Range):  Temp:  [96.5 °F (35.8 °C)-98 °F (36.7 °C)] 96.5 °F (35.8 °C)  Pulse:  [61-86] 66  Resp:  [16-18] 16  SpO2:  [96 %-99 %] 97 %  BP: ()/(52-89) 117/89        There is no height or weight on file to  calculate BMI.    Intake/Output Summary (Last 24 hours) at 1/10/2022 1702  Last data filed at 1/10/2022 1230  Gross per 24 hour   Intake 615.14 ml   Output --   Net 615.14 ml      Physical Exam  Vitals reviewed.   Constitutional:       General: She is not in acute distress.  HENT:      Head: Normocephalic and atraumatic.   Eyes:      Extraocular Movements: Extraocular movements intact.      Conjunctiva/sclera: Conjunctivae normal.   Cardiovascular:      Rate and Rhythm: Normal rate and regular rhythm.   Pulmonary:      Effort: Pulmonary effort is normal.      Breath sounds: No wheezing or rhonchi.   Abdominal:      General: Bowel sounds are normal.      Palpations: Abdomen is soft.      Tenderness: There is no abdominal tenderness.   Musculoskeletal:         General: No swelling or tenderness.   Skin:     General: Skin is warm and dry.   Neurological:      General: No focal deficit present.      Mental Status: She is alert and oriented to person, place, and time.      Cranial Nerves: No cranial nerve deficit.      Motor: No weakness.         Significant Labs:   All pertinent labs within the past 24 hours have been reviewed.  CBC:   Recent Labs   Lab 01/09/22  0516 01/10/22  0714   WBC 11.15 7.28   HGB 11.2* 10.7*   HCT 34.1* 33.0*    292     CMP:   Recent Labs   Lab 01/09/22  0516 01/09/22  1613 01/10/22  0714   *  --  138   K 3.1* 4.0 4.1     --  107   CO2 23  --  22*   GLU 79  --  80   BUN 6  --  9   CREATININE 0.8  --  0.7   CALCIUM 8.9  --  8.6*   PROT 6.7  --  5.9*   ALBUMIN 3.1*  --  2.9*   BILITOT 0.9  --  0.4   ALKPHOS 76  --  50*   AST 12  --  11   ALT 8*  --  7*   ANIONGAP 11  --  9   EGFRNONAA >60.0  --  >60.0            Assessment/Plan:      * Epilepsy  -patient reports she did not like the way the phenytoin made her feel, and she does not want to go back to that  -she would like to continue on with Keppra  -appreciate Neurology input  -monitor her response on Keppra  -EEG attempted,  but she has sew-ins but she did not want to remove and EEG could not be done  -she appears to be tolerating the Keppra without difficulty  -no further seizures so far  -Keppra transitioned to oral  -if she is tolerating the oral Keppra with no further complications, consider discharge on January 11    Hypokalemia  -resolved after potassium replacement  -monitor the trend and replace further as needed      UTI (urinary tract infection)  -presumptive E coli on urine culture  -I am uncertain if this is contributing to her low back discomfort.  She has no dysuria  -she reports no allergies with antibiotics.  Will treat with Rocephin        COVID-19  COVID-19 Virus Infection  Viral Pneumonia due to COVID-19  - COVID-19 testing: Collection Date: 9/24/2021 Collection Time:   2:03 PM   Patient is identified as Low risk for severe complications of COVID 19 based on COVID risk score of 0   Initiate standard COVID protocols; COVID-19 testing Collection Date: 9/24/2021 Collection Time:   2:03 PM ,Infection Control notification  and isolation- respiratory, contact and droplet per protocol    Diagnostics: (leukopenia, hyponatremia, hyperferritinemia, elevated troponin, elevated d-dimer, age, and comorbidities are significant predictors of poor clinical outcome)  CBC, CMP, Ferritin and CRP    Management: Continuous cardiac monitoring.     Patient does not require O2 at this time, continue to monitor  She is low risk at this time    Advance Care Planning  Current advance care plan has not been discussed with patient/family/POA and patient currently wishes Full Code.  -continue multivitamin  -monitor for changes in status  -today she noted frontal headache, myalgias, and arthralgias.  No alteration in mentation and no further seizures  -will add DVT prophylaxis dose Lovenox, with seizure history, would avoid systemic anticoagulation if possible  -transient low blood pressure earlier today that has normalized now. Continue to  monitor      VTE Risk Mitigation (From admission, onward)         Ordered     enoxaparin injection 40 mg  Daily         01/10/22 1312                Discharge Planning   NESHA: 1/10/2022     Code Status: Full Code   Is the patient medically ready for discharge?: No    Reason for patient still in hospital (select all that apply): Treatment  Discharge Plan A: Home with family        I spoke with her mother by phone to update her on the plan of care.    ANASTASIA Oscar MD  Department of Hospital Medicine   [facility] - Neurosurgery ([facility])

## 2022-01-10 NOTE — ASSESSMENT & PLAN NOTE
-presumptive E coli on urine culture  -I am uncertain if this is contributing to her low back discomfort.  She has no dysuria  -she reports no allergies with antibiotics.  Will treat with Rocephin

## 2022-01-11 VITALS
DIASTOLIC BLOOD PRESSURE: 57 MMHG | SYSTOLIC BLOOD PRESSURE: 90 MMHG | TEMPERATURE: 98 F | RESPIRATION RATE: 13 BRPM | OXYGEN SATURATION: 100 % | HEART RATE: 70 BPM

## 2022-01-11 LAB — BACTERIA UR CULT: ABNORMAL

## 2022-01-11 PROCEDURE — 97165 OT EVAL LOW COMPLEX 30 MIN: CPT

## 2022-01-11 PROCEDURE — 96366 THER/PROPH/DIAG IV INF ADDON: CPT | Performed by: EMERGENCY MEDICINE

## 2022-01-11 PROCEDURE — 99224 PR SUBSEQUENT OBSERVATION CARE,LEVEL I: ICD-10-PCS | Mod: ,,, | Performed by: INTERNAL MEDICINE

## 2022-01-11 PROCEDURE — 63600175 PHARM REV CODE 636 W HCPCS: Performed by: INTERNAL MEDICINE

## 2022-01-11 PROCEDURE — 99224 PR SUBSEQUENT OBSERVATION CARE,LEVEL I: CPT | Mod: ,,, | Performed by: INTERNAL MEDICINE

## 2022-01-11 PROCEDURE — G0378 HOSPITAL OBSERVATION PER HR: HCPCS

## 2022-01-11 PROCEDURE — 96361 HYDRATE IV INFUSION ADD-ON: CPT | Performed by: EMERGENCY MEDICINE

## 2022-01-11 PROCEDURE — 97161 PT EVAL LOW COMPLEX 20 MIN: CPT

## 2022-01-11 PROCEDURE — 94761 N-INVAS EAR/PLS OXIMETRY MLT: CPT

## 2022-01-11 PROCEDURE — 25000003 PHARM REV CODE 250: Performed by: INTERNAL MEDICINE

## 2022-01-11 PROCEDURE — 25000003 PHARM REV CODE 250: Performed by: STUDENT IN AN ORGANIZED HEALTH CARE EDUCATION/TRAINING PROGRAM

## 2022-01-11 RX ORDER — CEPHALEXIN 250 MG/1
250 CAPSULE ORAL EVERY 6 HOURS
Qty: 12 CAPSULE | Refills: 0 | Status: SHIPPED | OUTPATIENT
Start: 2022-01-11 | End: 2022-01-14

## 2022-01-11 RX ORDER — LEVETIRACETAM 750 MG/1
750 TABLET ORAL 2 TIMES DAILY
Qty: 60 TABLET | Refills: 1 | Status: SHIPPED | OUTPATIENT
Start: 2022-01-11 | End: 2023-01-11

## 2022-01-11 RX ADMIN — CEFTRIAXONE 1 G: 1 INJECTION, POWDER, FOR SOLUTION INTRAMUSCULAR; INTRAVENOUS at 04:01

## 2022-01-11 RX ADMIN — LEVETIRACETAM 750 MG: 750 TABLET ORAL at 08:01

## 2022-01-11 RX ADMIN — SODIUM CHLORIDE 250 ML: 0.9 INJECTION, SOLUTION INTRAVENOUS at 08:01

## 2022-01-11 RX ADMIN — Medication 500 MG: at 08:01

## 2022-01-11 RX ADMIN — MULTIPLE VITAMINS W/ MINERALS TAB 1 TABLET: TAB at 08:01

## 2022-01-11 NOTE — PLAN OF CARE
Problem: Physical Therapy Goal  Goal: Physical Therapy Goal  Description: Goals to be met by: eval    Patient will increase functional independence with mobility by performing: eval and treat    Outcome: Met     Pt tolerated PT session well.  Home no needs.      All needs met, all questions answered.  Philipp Vazquez PT, DPT

## 2022-01-11 NOTE — PT/OT/SLP EVAL
Physical Therapy Co-Evaluation  and Discharge Note    Patient Name:  Mary Gilmore   MRN:  [id number]    Recommendations:     Discharge Recommendations:  home   Discharge Equipment Recommendations: none   Barriers to discharge: None    Assessment:     Mary Gilmore is a 21 y.o. female admitted with a medical diagnosis of Epilepsy.  At this time, patient is functioning at their prior level of function and does not require further acute PT services.     Recent Surgery: * No surgery found *      Plan:     During this hospitalization, patient does not require further acute PT services.  Please re-consult if situation changes.      Subjective     Chief Complaint: decreased tolerance to functional mobility  Patient/Family Comments/goals: Return home  Pain/Comfort:  · Pain Rating 1: 0/10    Patients cultural, spiritual, Zoroastrian conflicts given the current situation: no    Living Environment:  Patient lives with their uncle in single story home, entrance: 3 steps with No hand rail, bathroom: tub/shower. Pt utilizes no AD for ambulation of all distances.      Prior to admission, patient was independent with ADLs. Patient uses DME as follows: none. DME owned (not currently used): none. Upon discharge, patient will have assistance from family with 24/7 assist.     Objective:     Communicated with nursing prior to session.  Patient found HOB elevated with bed alarm,peripheral IV  upon PT entry to room.    General Precautions: Standard, airborne,contact,droplet,fall,seizure   Orthopedic Precautions:N/A   Braces: N/A   Respiratory Status:   ·      Exams:  · Cognitive Exam:  Patient is oriented to Person, Place, Time and Situation  · RLE ROM: WFL  · RLE Strength: WFL  · LLE ROM: WFL  · LLE Strength: WFL  · Postural Exam:  Patient presented with the following abnormalities:    · -       Rounded shoulders      Functional Mobility:  · Bed Mobility:  Rolling Right: independence  · Scooting: independence  · Supine to Sit:  independence  · Sit to Supine: independence  · Head of bed position: n/a    · Transfers:     · Sit to Stand: independence with no AD    · Gait: Patient ambulated 25' with no AD and independence. Patient demonstrates steady gait. All lines remained intact throughout ambulation trial.    · Balance: normal    Therapeutic Activities:  Patient educated on role of acute care PT and PT POC, safety while in hospital including calling nurse for mobility, call light usage, benefits of out of bed mobility, breathing technique, fall risk, posture and risks of prolonged bed rest by explanation.    Patient demonstrates good understanding of education provided this day.   Whiteboard updated      Therapeutic Exercises:  n/a    AM-PAC 6 CLICK MOBILITY  Total Score:24     Patient left HOB elevated with all lines intact, call button in reach, bed alarm on, RN notified and spouse present.    GOALS:   Multidisciplinary Problems     Physical Therapy Goals     Not on file          Multidisciplinary Problems (Resolved)        Problem: Physical Therapy Goal    Goal Priority Disciplines Outcome Goal Variances Interventions   Physical Therapy Goal   (Resolved)     PT, PT/OT Met     Description: Goals to be met by: eval    Patient will increase functional independence with mobility by performing: eval and treat                     History:     No past medical history on file.    No past surgical history on file.    Time Tracking:     PT Received On: 01/11/22  PT Start Time: 1014     PT Stop Time: 1021  PT Total Time (min): 7 min     Billable Minutes: Evaluation 7      01/11/2022    Co-treatment performed for this visit due to patient need for two skilled therapists to ensure patient and staff safety and to accommodate for patient activity tolerance/pain management

## 2022-01-11 NOTE — NURSING
AVS reviewed w/ patient. Patient verbalized understanding of education. All comments and concerns addressed. PIV & telemetry removed. VSS at discharge. All belongings sent with pt via private vehicle.

## 2022-01-11 NOTE — PLAN OF CARE
[facility] - Neurosurgery (Hospital)  Discharge Final Note    Primary Care Provider: Philippe Viera Jr, MD  Expected Discharge Date: 1/11/2022    Patient medically ready for discharge to home with no needs.  Nurse can call report to NA.  Transportation provided per family.   Is family/patient aware of discharge yes - patient.  Hospital follow up scheduled, per CM.  Vascular Neurology to schedule follow up if necessary.  Final Discharge Note (most recent)     Final Note - 01/11/22 1313        Final Note    Assessment Type Final Discharge Note     Anticipated Discharge Disposition Home or Self Care     Hospital Resources/Appts/Education Provided Appointments scheduled and added to AVS        Post-Acute Status    Post-Acute Authorization Other     Other Status No Post-Acute Service Needs     Discharge Delays None known at this time                 Important Message from Medicare         Referral Info (most recent)     Referral Info    No documentation.               Contact Info     KANE Harrison   Specialty: Family Medicine    [address]  [facility] [address]   Phone: [phone]       Next Steps: Go on 1/19/2022    Instructions: Hospital Follow-up at 2pm (Please call at least 4 days prior to appointment)        Future Appointments   Date Time Provider Department Center   1/19/2022  2:00 PM KANE Sinclair [facility]     Tressa Gamino RN  Case Management  Ext: [phone]  01/11/2022

## 2022-01-11 NOTE — PLAN OF CARE
Pt resting in bed comfortably. PIV line intact and free of infection and irritation. Fall precautions maintained, no falls noted. Call light within reach, bed locked and in lowest position. Non-skid socks on while out of bed. Patient instructed to call for assistance. Skin integrity maintained as patient is independent with frequent repositioning. No complaints of pain or N/V. Tolerating prescribed diet.Progressing towards goals. Will continue to monitor and follow plan of care.

## 2022-01-11 NOTE — PLAN OF CARE
Problem: Occupational Therapy Goal  Goal: Occupational Therapy Goal  Outcome: Met     Pt evaluated.  She is performing ADLs and functional mobility with independence.  Pt does not require acute OT services.  D/c to home with no therapy needs once medically cleared.

## 2022-01-11 NOTE — PT/OT/SLP EVAL
"Occupational Therapy   Evaluation and Discharge Note    Name: Mary Gilmore  MRN: [id number]  Admitting Diagnosis:  Epilepsy   Recent Surgery: * No surgery found *       Co-eval with PT to have 2 skilled therapists present to safely assess pt's functional mobility.    Recommendations:     Discharge Recommendations: home  Discharge Equipment Recommendations:  none  Barriers to discharge:  None    Assessment:     Mary Gilmore is a 21 y.o. female with a medical diagnosis of Epilepsy.   She is performing ADLs and functional mobility with independence.  Pt does not require acute OT services.  D/c to home with no therapy needs once medically cleared.     Plan:     During this hospitalization, patient does not require further acute OT services.  Please re-consult if situation changes.    · Plan of Care Reviewed with: patient,spouse    Subjective     Chief Complaint: "Why is therapy here."  Patient/Family Comments/goals: "I don't need therapy."     Occupational Profile:  Living Environment: Pt lives with her uncle in a [facility] with 3 NINI with no HR.  She has a tub/shower combo with no DME.  Pt reports no recent falls.    Previous level of function: Independent with ADLs and functional mobility.  Pt drives.  Roles and Routines: none mentioned.  Pt's wife was present.   Equipment Used at home:  none  Assistance upon Discharge: Her uncle and wife     Pain/Comfort:  · Pain Rating 1: 0/10  · Pain Rating Post-Intervention 1: 0/10    Patients cultural, spiritual, Gnosticist conflicts given the current situation: no    Objective:     Communicated with: nursing and PT prior to session.  Patient found supine with peripheral IV,bed alarm with her wife present upon OT entry to room.    General Precautions: Standard, fall,droplet,contact,airborne,seizure   Orthopedic Precautions:N/A   Braces: N/A  Respiratory Status: Room air     Occupational Performance:    Bed Mobility:    · Patient completed Rolling/Turning to Left with  " independence  · Patient completed Scooting/Bridging with independence  · Patient completed Supine to Sit with independence  · Patient completed Sit to Supine with independence    Functional Mobility/Transfers:  · Patient completed Sit <> Stand Transfer from EOB with independence  with  no assistive device   · Functional Mobility: Pt ambulated a functional household distance in her room from EOB to the bedroom door and back with Rabun with no AD.  In-room activity only due to pt being COVID positive.        Activities of Daily Living:  · Lower Body Dressing: independence to nadine/doff her Crocs while sitting EOB.    Cognitive/Visual Perceptual:  Cognitive/Psychosocial Skills:     -       Oriented to: Person, Place, Time and Situation   -       Follows Commands/attention:Follows multistep  commands  -       Communication: clear/fluent    Physical Exam:  Upper Extremity Range of Motion:  WFL  Upper Extremity Strength: WFL     AMPAC 6 Click ADL:  AMPAC Total Score: 24    Treatment & Education:  Pt and her wife edu on role of OT, POC.     - Self care tasks completed-- as noted above     Education:    Patient left supine with all lines intact, call button in reach, bed alarm on and her wife present    GOALS:   Multidisciplinary Problems     Occupational Therapy Goals     Not on file          Multidisciplinary Problems (Resolved)        Problem: Occupational Therapy Goal    Goal Priority Disciplines Outcome Interventions   Occupational Therapy Goal   (Resolved)     OT, PT/OT Met                    History:     No past medical history on file.    No past surgical history on file.    Time Tracking:     OT Date of Treatment: 01/11/22  OT Start Time: 1014  OT Stop Time: 1022  OT Total Time (min): 8 min    Billable Minutes:Evaluation 8 min    1/11/2022

## 2022-01-11 NOTE — PLAN OF CARE
Problem: Adult Inpatient Plan of Care  Goal: Plan of Care Review  1/11/2022 0410 by Ronna Pedraza RN  Outcome: Ongoing, Progressing     Problem: Skin Injury Risk Increased  Goal: Skin Health and Integrity  1/11/2022 0410 by Ronna Pedraza RN  Outcome: Ongoing, Progressing     Problem: Seizure, Active Management  Goal: Absence of Seizure/Seizure-Related Injury  1/11/2022 0410 by Ronna Pedraza RN  Outcome: Ongoing, Progressing       Patient is AAO x4. POC reviewed with patient. Patient verbalized understanding. Patient's breathing is unlabored with equal chest expansion. Patient ambulates to the restroom. Patient remained free from falls. Patient rested well through shift. Bed in lowest position, side rails up x2, no complaints or signs of distress. WCTM.  See flowsheets for full assessment and VS info.  Patient has had soft blood pressures during shift.

## 2022-01-11 NOTE — PLAN OF CARE
Problem: Adult Inpatient Plan of Care  Goal: Plan of Care Review  Outcome: Ongoing, Progressing  Goal: Patient-Specific Goal (Individualized)  Outcome: Ongoing, Progressing  Goal: Optimal Comfort and Wellbeing  Outcome: Ongoing, Progressing  Goal: Readiness for Transition of Care  Outcome: Ongoing, Progressing     Problem: Seizure, Active Management  Goal: Absence of Seizure/Seizure-Related Injury  Outcome: Ongoing, Progressing     Problem: Infection  Goal: Absence of Infection Signs and Symptoms  Outcome: Ongoing, Progressing    POC reviewed with the patient and they verbalized understanding. All comments and concerns addressed. Bed locked in lowest position with bed alarm set, call light within reach. Safety precautions maintained. VSS, see flowsheets. No events this shift. Will continue to monitor for changes to POC and clinical condition.

## 2022-01-12 NOTE — ASSESSMENT & PLAN NOTE
-E. Coli that is sensitive to Augmentin.  Before the sensitivity came back, I discharged her with an oral cephalosporin.  I called her by phone and spoke with her outpatient pharmacy.  The E coli is sensitive to Augmentin but resistant to cephalosporins.  She noted no allergies to Augmentin.  With uncomplicated cystitis she will take 500 mg twice a day for 5 days.  The pharmacy also removed the prescription for the cephalosporin.

## 2022-01-12 NOTE — ASSESSMENT & PLAN NOTE
-patient reports she did not like the way the phenytoin made her feel, and she does not want to go back to that  -she would like to continue on with Keppra  -appreciate Neurology input  -monitor her response on Keppra  -EEG attempted, but she has sew-ins but she did not want to remove and EEG could not be done  -she tolerated the oral Keppra at 750 mg with no complications and no further seizures  -she plans to follow-up with Neurology at [facility]  -seizure precaution information given in her discharge instructions

## 2022-01-12 NOTE — ASSESSMENT & PLAN NOTE
COVID-19 Virus Infection  Viral Pneumonia due to COVID-19  - COVID-19 testing: Collection Date: 9/24/2021 Collection Time:   2:03 PM   Patient is identified as Low risk for severe complications of COVID 19 based on COVID risk score of 0   Initiate standard COVID protocols; COVID-19 testing Collection Date: 9/24/2021 Collection Time:   2:03 PM ,Infection Control notification  and isolation- respiratory, contact and droplet per protocol    Diagnostics: (leukopenia, hyponatremia, hyperferritinemia, elevated troponin, elevated d-dimer, age, and comorbidities are significant predictors of poor clinical outcome)  CBC, CMP, Ferritin and CRP    Management: Continuous cardiac monitoring.     Patient does not require O2 at this time, continue to monitor  She is low risk at this time    Advance Care Planning  Current advance care plan has not been discussed with patient/family/POA and patient currently wishes Full Code.   -transient low blood pressure earlier today that has normalized now. Continue to monitor  -no respiratory symptoms  -COVID symptom track her ordered at discharge  -recommended isolation/COVID quarantine for 10 days after her positive COVID on January 7

## 2022-01-12 NOTE — DISCHARGE SUMMARY
Elmer Carranza - Neurosurgery ([facility])  [facility] Medicine  Discharge Summary      Patient Name: Mary Gilmore  MRN: [id number]  Patient Class: OP- Observation  Admission Date: 1/7/2022  Hospital Length of Stay: 0 days  Discharge Date and Time: 1/11/2022  5:25 PM  Attending Physician: No att. providers found   Discharging Provider: ANASTASIA Oscar MD  Primary Care Provider: Philippe Viera Jr, MD      HPI:   21 year old AAF with a PMH of Epilepsy on Dilantin [place] [facility] [place] of break thru seizures for 2 days. Patient was working out this morning between 6am and 9am. After her workout, went to shower and started to feel lightheaded. Lost consciousness and friend at gym witnessed a tonic clonic seizure. No incontinence or tongue biting. Has her normal post-ictal headache. Mother reports a 20 minute post-ictal period. Had a similar episode yesterday at home. Concern her PO medications are not as effective as the IV infusions she was getting 3 months ago (of dilantin). Reports not feeling well over the past few days and started her menstrual cycles yesterday. Did not eat breakfast this morning prior to working out and took her medication a little late today, but no missed medications. One episode of vomiting yesterday. No fevers/chills, chest pain, sore throat, coughing, congestion, abdominal pain, or urinary symptoms.     Patient also tested positive for COVID. She states she has had headaches for the past 3 days and has been around other people who were sick but no one has been confirmed COVID positive. She states she currently has body aches and chills but she believes she has these symptoms after having seizures. She denies SOB.      PMH: Epilepsy on Dilantin, eczema  PSH: n/a  FH: n/a  SH: n/a      * No surgery found *      Hospital Course:   Admitted with seizures and COVID.  COVID finding was incidental other than her fever.  No respiratory compromise.  She has been treated with Keppra.  She said she did not  like the way the phenytoin made her feel, and she would not go back to that.  She wanted to stay with the Keppra.  She had 1 seizure during her hospital stay, and Keppra dosing was increased to 750 mg twice a day.  Urine culture from January 8 grew presumptive E coli.  Rocephin was started.  Temperature improved on January 9 with no further seizure activity.  She remained afebrile with no respiratory symptoms.  During her stay, her arthralgias, myalgias, and fatigue significantly improved.  Keppra was transitioned to oral, and she tolerated that without difficulty.  She noted no headaches and no neurologic changes on the oral Keppra.  She felt well and was eager to go home on January 11. Of note, her systolic blood pressure decreased into the 90s at times.  She was given IV fluids, but she noted that her blood pressure often does that during hospitalizations.  She was asymptomatic with those blood pressures.  She will be discharged home on oral Keppra and a course of oral antibiotics to complete for her urinary tract infection.  She noted she is planning to follow-up with Neurology at [facility].  Seizure precautions were given during her discharge.  COVID symptoms tracker was ordered for discharge.  Additionally, she was recommended to isolate with the COVID for 10 days past her positive COVID test on January 7.        Goals of Care Treatment Preferences:  Code Status: Full Code      Consults:   Consults (From admission, onward)        Status Ordering Provider     Inpatient consult to Neurology  Once        Provider:  (Not yet assigned)    Completed MERRICK REDD     Inpatient consult to Internal Medicine  Once        Provider:  (Not yet assigned)    Acknowledged GARY LOPEZ          * Epilepsy  -patient reports she did not like the way the phenytoin made her feel, and she does not want to go back to that  -she would like to continue on with Keppra  -appreciate Neurology input  -monitor her response  on Keppra  -EEG attempted, but she has sew-ins but she did not want to remove and EEG could not be done  -she tolerated the oral Keppra at 750 mg with no complications and no further seizures  -she plans to follow-up with Neurology at [facility]  -seizure precaution information given in her discharge instructions    Hypokalemia  -resolved after potassium replacement        UTI (urinary tract infection)  -E. Coli that is sensitive to Augmentin.  Before the sensitivity came back, I discharged her with an oral cephalosporin.  I called her by phone and spoke with her outpatient pharmacy.  The E coli is sensitive to Augmentin but resistant to cephalosporins.  She noted no allergies to Augmentin.  With uncomplicated cystitis she will take 500 mg twice a day for 5 days.  The pharmacy also removed the prescription for the cephalosporin.    COVID-19  COVID-19 Virus Infection  Viral Pneumonia due to COVID-19  - COVID-19 testing: Collection Date: 9/24/2021 Collection Time:   2:03 PM   Patient is identified as Low risk for severe complications of COVID 19 based on COVID risk score of 0   Initiate standard COVID protocols; COVID-19 testing Collection Date: 9/24/2021 Collection Time:   2:03 PM ,Infection Control notification  and isolation- respiratory, contact and droplet per protocol    Diagnostics: (leukopenia, hyponatremia, hyperferritinemia, elevated troponin, elevated d-dimer, age, and comorbidities are significant predictors of poor clinical outcome)  CBC, CMP, Ferritin and CRP    Management: Continuous cardiac monitoring.     Patient does not require O2 at this time, continue to monitor  She is low risk at this time    Advance Care Planning  Current advance care plan has not been discussed with patient/family/POA and patient currently wishes Full Code.  -transient low blood pressure earlier today that has normalized now. Continue to monitor  -no respiratory symptoms  -COVID symptom track her ordered at discharge  -recommended  isolation/COVID quarantine for 10 days after her positive COVID on January 7      Final Active Diagnoses:    Diagnosis Date Noted POA    PRINCIPAL PROBLEM:  Epilepsy [G40.909] 01/08/2022 Yes    UTI (urinary tract infection) [N39.0] 01/09/2022 Yes    Hypokalemia [E87.6] 01/09/2022 Yes    COVID-19 [U07.1] 01/08/2022 Yes      Problems Resolved During this Admission:       Discharged Condition: good    Disposition: Home or Self Care    Follow Up:   Follow-up Information     ANAMARIA Harrison Go on 1/19/2022.    Specialty: Family Medicine  Why: Hospital Follow-up at 2pm (Please call at least 4 days prior to appointment)  Contact information:  [address]  [facility] [id number] [phone]                       Patient Instructions:      Diet Adult Regular     Other restrictions (specify):   Order Comments: - patient on seizure precautions until six months seizure free including no driving or operating heavy machinery, no submerging in water, take showers instead of baths whenever possible, do not care for children alone, caution when using hot objects especially cooking and do not scale ladders or heights unsupported or unaccompanied.     -COVID quarantine/isolation for 10 days after your positive test on January 7, 2022 (until January 17)     Notify your health care provider if you experience any of the following:  increased confusion or weakness     Notify your health care provider if you experience any of the following:  persistent dizziness, light-headedness, or visual disturbances     Notify your health care provider if you experience any of the following:  severe persistent headache     Notify your health care provider if you experience any of the following:  severe uncontrolled pain     Notify your health care provider if you experience any of the following:  persistent nausea and vomiting or diarrhea     Notify your health care provider if you experience any of the  following:  temperature >100.4     COVID-19 Home Symptom Monitoring  - Duration (days): 14     Order Specific Question Answer Comments   Duration (days) 14      Activity as tolerated       Significant Diagnostic Studies: Labs:   BMP:   Recent Labs   Lab 01/10/22  0714   GLU 80      K 4.1      CO2 22*   BUN 9   CREATININE 0.7   CALCIUM 8.6*   , CMP   Recent Labs   Lab 01/10/22  0714      K 4.1      CO2 22*   GLU 80   BUN 9   CREATININE 0.7   CALCIUM 8.6*   PROT 5.9*   ALBUMIN 2.9*   BILITOT 0.4   ALKPHOS 50*   AST 11   ALT 7*   ANIONGAP 9   ESTGFRAFRICA >60.0   EGFRNONAA >60.0    and CBC   Recent Labs   Lab 01/10/22  0714   WBC 7.28   HGB 10.7*   HCT 33.0*        Microbiology:   Urine Culture    Lab Results   Component Value Date    LABURIN ESCHERICHIA COLI  >100,000 cfu/ml   (A) 01/08/2022       Pending Diagnostic Studies:     Procedure Component Value Units Date/Time    Pregnancy, urine rapid [[id number]] Collected: 01/08/22 0027    Order Status: Sent Lab Status: In process Updated: 01/08/22 0028    Specimen: Urine, Clean Catch          Medications:  Reconciled Home Medications:      Medication List      START taking these medications    Augmentin 500 mg twice daily for 5 days     levETIRAcetam 750 MG Tab  Commonly known as: KEPPRA  Take 1 tablet (750 mg total) by mouth 2 (two) times daily.            Indwelling Lines/Drains at time of discharge:   Lines/Drains/Airways     None                 Time spent on the discharge of patient: >30 minutes         ANASTASIA Oscar MD  Department of Hospital Medicine  [facility] ([facility])

## 2022-05-03 ENCOUNTER — PATIENT MESSAGE (OUTPATIENT)
Dept: RESEARCH | Facility: HOSPITAL | Age: 22
End: 2022-05-03
Payer: MEDICAID

## 2023-08-11 ENCOUNTER — PATIENT MESSAGE (OUTPATIENT)
Dept: RESEARCH | Facility: HOSPITAL | Age: 23
End: 2023-08-11
Payer: MEDICAID